# Patient Record
Sex: MALE | Race: OTHER | Employment: FULL TIME | URBAN - NONMETROPOLITAN AREA
[De-identification: names, ages, dates, MRNs, and addresses within clinical notes are randomized per-mention and may not be internally consistent; named-entity substitution may affect disease eponyms.]

---

## 2017-04-06 ENCOUNTER — OFFICE VISIT (OUTPATIENT)
Dept: PRIMARY CARE CLINIC | Age: 27
End: 2017-04-06

## 2017-04-06 VITALS
RESPIRATION RATE: 14 BRPM | DIASTOLIC BLOOD PRESSURE: 60 MMHG | SYSTOLIC BLOOD PRESSURE: 100 MMHG | HEIGHT: 67 IN | BODY MASS INDEX: 22.29 KG/M2 | OXYGEN SATURATION: 99 % | WEIGHT: 142 LBS | HEART RATE: 75 BPM | TEMPERATURE: 99 F

## 2017-04-06 DIAGNOSIS — J06.9 ACUTE URI: Primary | ICD-10-CM

## 2017-04-06 PROCEDURE — 99213 OFFICE O/P EST LOW 20 MIN: CPT | Performed by: FAMILY MEDICINE

## 2017-04-06 RX ORDER — DICYCLOMINE HYDROCHLORIDE 10 MG/1
10 CAPSULE ORAL
COMMUNITY
End: 2021-10-25

## 2017-04-06 ASSESSMENT — ENCOUNTER SYMPTOMS
EYES NEGATIVE: 1
GASTROINTESTINAL NEGATIVE: 1
RESPIRATORY NEGATIVE: 1
RHINORRHEA: 1
COUGH: 0
ALLERGIC/IMMUNOLOGIC NEGATIVE: 1
SORE THROAT: 1

## 2017-11-30 ENCOUNTER — TELEPHONE (OUTPATIENT)
Dept: FAMILY MEDICINE CLINIC | Age: 27
End: 2017-11-30

## 2020-11-19 ENCOUNTER — HOSPITAL ENCOUNTER (OUTPATIENT)
Age: 30
Setting detail: SPECIMEN
Discharge: HOME | End: 2020-11-19

## 2020-11-19 ENCOUNTER — OFFICE VISIT (OUTPATIENT)
Dept: PRIMARY CARE CLINIC | Age: 30
End: 2020-11-19

## 2020-11-19 VITALS
WEIGHT: 152 LBS | DIASTOLIC BLOOD PRESSURE: 74 MMHG | RESPIRATION RATE: 16 BRPM | TEMPERATURE: 98.4 F | HEART RATE: 68 BPM | BODY MASS INDEX: 24.43 KG/M2 | SYSTOLIC BLOOD PRESSURE: 114 MMHG | OXYGEN SATURATION: 98 % | HEIGHT: 66 IN

## 2020-11-19 PROCEDURE — 99213 OFFICE O/P EST LOW 20 MIN: CPT | Performed by: PHYSICIAN ASSISTANT

## 2020-11-19 PROCEDURE — 99212 OFFICE O/P EST SF 10 MIN: CPT

## 2020-11-19 PROCEDURE — U0003 INFECTIOUS AGENT DETECTION BY NUCLEIC ACID (DNA OR RNA); SEVERE ACUTE RESPIRATORY SYNDROME CORONAVIRUS 2 (SARS-COV-2) (CORONAVIRUS DISEASE [COVID-19]), AMPLIFIED PROBE TECHNIQUE, MAKING USE OF HIGH THROUGHPUT TECHNOLOGIES AS DESCRIBED BY CMS-2020-01-R: HCPCS

## 2020-11-19 ASSESSMENT — ENCOUNTER SYMPTOMS
DIARRHEA: 0
WHEEZING: 0
SHORTNESS OF BREATH: 1
RHINORRHEA: 1
NAUSEA: 1
SORE THROAT: 0
VOMITING: 1
CHEST TIGHTNESS: 1
COUGH: 1

## 2020-11-19 ASSESSMENT — PATIENT HEALTH QUESTIONNAIRE - PHQ9
SUM OF ALL RESPONSES TO PHQ QUESTIONS 1-9: 0
SUM OF ALL RESPONSES TO PHQ QUESTIONS 1-9: 0
2. FEELING DOWN, DEPRESSED OR HOPELESS: 0
SUM OF ALL RESPONSES TO PHQ QUESTIONS 1-9: 0

## 2020-11-19 NOTE — PROGRESS NOTES
[facility]    Subjective:      Patient ID: Tiffanie Carmen is a 26 y.o. y.o. male. Patient is seen today due to illness for 2 days. His SO is ill and a coworker has covid too. Has fever chills. Fevers come and go. Gets sweaty and cold. Has a dry cough. Feels like can not catch his breath with movement. Feels heart rate increase then too. Is slight achy. Has nausea and gagging. Some dry heaves noted. No diarrhea. Everything tastes like black licorice. Smell is ok. Took theraflu last night. Past Medical History:   Diagnosis Date    Deep vein thrombosis (DVT) (HCC)     right upper extremity    Ear infection     history of multiple as a child    IV drug abuse ([address]     Oxycontin    Marijuana abuse     Tobacco abuse        Past Surgical History:   Procedure Laterality Date    COLONOSCOPY  8/26/2015    Protitis    HAND SURGERY Right        Family History   Problem Relation Age of Onset    Heart Disease Other     Hypertension Other     Asthma Other     Diabetes Paternal Grandmother        No Known Allergies    Current Outpatient Medications   Medication Sig Dispense Refill    dicyclomine (BENTYL) 10 MG capsule Take 10 mg by mouth      ibuprofen (ADVIL;MOTRIN) 400 MG tablet Take 400 mg by mouth every 6 hours as needed for Pain       No current facility-administered medications for this visit. Review of Systems   Constitutional: Positive for chills, diaphoresis, fatigue and fever. Negative for appetite change. Is more hungry today. HENT: Positive for congestion, postnasal drip and rhinorrhea. Negative for sneezing and sore throat. Took motrin too. No flu shot. Respiratory: Positive for cough, chest tightness and shortness of breath. Negative for wheezing. Cardiovascular: Negative for chest pain and palpitations. Gastrointestinal: Positive for nausea and vomiting. Negative for diarrhea. Musculoskeletal: Positive for myalgias.    Skin: Negative for rash. Neurological: Positive for light-headedness and headaches. Negative for dizziness and numbness. Psychiatric/Behavioral: Negative for sleep disturbance. The patient is not nervous/anxious. Sleeping a lot. Objective:      /74   Pulse 68   Temp 98.4 °F (36.9 °C) (Temporal)   Resp 16   Ht 5' 6\" (1.676 m)   Wt 152 lb (68.9 kg)   SpO2 98%   BMI 24.53 kg/m²     Physical Exam  Vitals signs and nursing note reviewed. Constitutional:       General: He is not in acute distress. Appearance: Normal appearance. He is well-developed. He is not ill-appearing. HENT:      Head: Normocephalic and atraumatic. Right Ear: Tympanic membrane, ear canal and external ear normal.      Left Ear: Tympanic membrane, ear canal and external ear normal.      Nose: Congestion present. No rhinorrhea. Mouth/Throat:      Mouth: Mucous membranes are moist.      Pharynx: No oropharyngeal exudate or posterior oropharyngeal erythema. Eyes:      General: No scleral icterus. Conjunctiva/sclera: Conjunctivae normal.   Neck:      Musculoskeletal: Normal range of motion and neck supple. No neck rigidity or muscular tenderness. Cardiovascular:      Rate and Rhythm: Normal rate and regular rhythm. Heart sounds: Normal heart sounds. No murmur. No gallop. Pulmonary:      Effort: Pulmonary effort is normal. No respiratory distress. Breath sounds: Normal breath sounds. No stridor. No wheezing, rhonchi or rales. Abdominal:      General: Bowel sounds are normal. There is no distension. Palpations: Abdomen is soft. There is no mass. Tenderness: There is no abdominal tenderness. There is no guarding or rebound. Hernia: No hernia is present. Musculoskeletal:         General: No swelling, tenderness, deformity or signs of injury. Right lower leg: No edema. Left lower leg: No edema. Lymphadenopathy:      Cervical: No cervical adenopathy.    Skin:

## 2020-11-21 LAB — SARS-COV-2, NAA: DETECTED

## 2021-10-25 ENCOUNTER — TELEPHONE (OUTPATIENT)
Dept: PRIMARY CARE CLINIC | Age: 31
End: 2021-10-25

## 2021-10-25 ENCOUNTER — HOSPITAL ENCOUNTER (OUTPATIENT)
Dept: GENERAL RADIOLOGY | Age: 31
Discharge: HOME | End: 2021-10-27
Payer: COMMERCIAL

## 2021-10-25 ENCOUNTER — OFFICE VISIT (OUTPATIENT)
Dept: PRIMARY CARE CLINIC | Age: 31
End: 2021-10-25
Payer: COMMERCIAL

## 2021-10-25 VITALS
BODY MASS INDEX: 23.56 KG/M2 | HEART RATE: 79 BPM | OXYGEN SATURATION: 98 % | HEIGHT: 66 IN | RESPIRATION RATE: 18 BRPM | WEIGHT: 146.6 LBS | SYSTOLIC BLOOD PRESSURE: 112 MMHG | DIASTOLIC BLOOD PRESSURE: 78 MMHG | TEMPERATURE: 97.7 F

## 2021-10-25 DIAGNOSIS — S83.412A SPRAIN OF MEDIAL COLLATERAL LIGAMENT OF LEFT KNEE, INITIAL ENCOUNTER: Primary | ICD-10-CM

## 2021-10-25 DIAGNOSIS — M25.562 LEFT MEDIAL KNEE PAIN: ICD-10-CM

## 2021-10-25 PROCEDURE — 73562 X-RAY EXAM OF KNEE 3: CPT

## 2021-10-25 PROCEDURE — 99213 OFFICE O/P EST LOW 20 MIN: CPT | Performed by: NURSE PRACTITIONER

## 2021-10-25 NOTE — LETTER
[address] [facility] A department of [place] [address]  Phone: [phone]  Fax: [phone]    MILAGROS Neal CNP        October 25, 2021     Patient: Amy Nash   YOB: 1990   Date of Visit: 10/25/2021       To Whom it May Concern:    Zan Gamez was seen in my clinic on 10/25/2021. He may return to work on 11/1/21 with no restrictions. If you have any questions or concerns, please don't hesitate to call.     Sincerely,         MILAGROS Neal CNP

## 2021-10-25 NOTE — LETTER
[facility] A department of [facility]  Phone: [phone]  Fax: [phone]        MILAGROS Neal CNP      November 6, 2021    Patient:   Amy Nash  Date of Birth   1990  Date of visit   10/25/2021        To Whom it May Concern:      Zan Gamez was seen in my clinic on 10/25/2021. Please excuse from work thru 11/22/2021. During this time he will be having tests done and visits with specialists. If you have any questions or concerns, please don't hesitate to call.       Sincerely,      MILAGROS Neal CNP/[place]

## 2021-10-25 NOTE — PROGRESS NOTES
Subjective:      Patient ID: Elmer Tejeda is a 33 y.o. male coming in for   Chief Complaint   Patient presents with    Knee Pain     workers comp. hurt knee at work trying to step over pallets, knee was stuck and twisted. left side. employer has not been notified. DOI: 10/22/2021.  Other     works at [employer] in [employer]        Knee Pain   Incident onset: 10/22/21. The incident occurred at work. The injury mechanism was a fall and a twisting injury (pt was stepping over pallets and got his left foot caught in between two pallets. pt fell forwards with foot caught. twisted knee during the fall). Pain location: left medial knee. The quality of the pain is described as aching. Pertinent negatives include no inability to bear weight. The symptoms are aggravated by weight bearing and movement. He has tried nothing for the symptoms. Review of Systems   Musculoskeletal: Positive for arthralgias. Objective:   Physical Exam  Vitals and nursing note reviewed. Constitutional:       General: He is not in acute distress. Appearance: Normal appearance. Pulmonary:      Effort: Pulmonary effort is normal.   Musculoskeletal:      Left knee: No crepitus. Normal range of motion. Tenderness present over the medial joint line. No LCL laxity, MCL laxity, ACL laxity or PCL laxity. Normal alignment and normal patellar mobility. Normal pulse. Neurological:      General: No focal deficit present. Mental Status: He is alert and oriented to person, place, and time. Assessment:      1. Sprain of medial collateral ligament of left knee, initial encounter    2. Left medial knee pain           Plan:    xray left knee: Normal exam  Continue with RICE therapy. Motrin for pain. Off remaining rest of week. Follow up ortho.        Orders Placed This Encounter   Procedures    XR KNEE LEFT (3 VIEWS)     Standing Status:   Future     Number of Occurrences:   1     Standing Expiration Date:   10/25/2022 Order Specific Question:   Reason for exam:     Answer:   pain to left medial knee twisted knee on 10/22/21   Sharyle Mandril, MD, Orthopedic Surgery, [place]     Referral Priority:   Routine     Referral Type:   Eval and Treat     Referral Reason:   Specialty Services Required     Referred to Provider:   Claudia Vo MD     Requested Specialty:   Orthopedic Surgery     Number of Visits Requested:   1      Outpatient Encounter Medications as of 10/25/2021   Medication Sig Dispense Refill    [DISCONTINUED] dicyclomine (BENTYL) 10 MG capsule Take 10 mg by mouth (Patient not taking: Reported on 10/25/2021)      [DISCONTINUED] ibuprofen (ADVIL;MOTRIN) 400 MG tablet Take 400 mg by mouth every 6 hours as needed for Pain (Patient not taking: Reported on 10/25/2021)       No facility-administered encounter medications on file as of 10/25/2021.             Dat Mcclendon, APRAUGUST - CNP

## 2021-10-30 ENCOUNTER — OFFICE VISIT (OUTPATIENT)
Dept: PRIMARY CARE CLINIC | Age: 31
End: 2021-10-30
Payer: COMMERCIAL

## 2021-10-30 VITALS
HEART RATE: 70 BPM | SYSTOLIC BLOOD PRESSURE: 120 MMHG | DIASTOLIC BLOOD PRESSURE: 74 MMHG | TEMPERATURE: 98.2 F | OXYGEN SATURATION: 98 %

## 2021-10-30 DIAGNOSIS — S83.412A SPRAIN OF MEDIAL COLLATERAL LIGAMENT OF LEFT KNEE, INITIAL ENCOUNTER: Primary | ICD-10-CM

## 2021-10-30 DIAGNOSIS — M25.562 LEFT MEDIAL KNEE PAIN: ICD-10-CM

## 2021-10-30 PROCEDURE — 99213 OFFICE O/P EST LOW 20 MIN: CPT | Performed by: NURSE PRACTITIONER

## 2021-10-30 RX ORDER — TRAMADOL HYDROCHLORIDE 50 MG/1
50 TABLET ORAL EVERY 6 HOURS PRN
Qty: 12 TABLET | Refills: 0 | Status: SHIPPED | OUTPATIENT
Start: 2021-10-30 | End: 2021-11-02

## 2021-10-30 NOTE — LETTER
[address] [facility] A department of [place] [address]  Phone: [phone]  Fax: [phone]    MILAGROS Garcia CNP        October 30, 2021     Patient: Hernan Rodriguez   YOB: 1990   Date of Visit: 10/30/2021       To Whom it May Concern:    Fer Schmidt was seen in my clinic on 10/30/2021. Please continue to excuse Mr. Huffman Marian from work until 11/8/21. If you have any questions or concerns, please don't hesitate to call.     Sincerely,         MILAGROS Garcia CNP

## 2021-10-30 NOTE — PROGRESS NOTES
Subjective:      Patient ID: Ashish Macias is a 33 y.o. male coming in for   Chief Complaint   Patient presents with    Knee Pain     left workers comp needs more time off waiting for approval to see ortho DOI 10/22/2021      HPI  Initial injury occurred 10/22/21. I treated pt on 10/25/21. At that time pt was advised weight bearing as tolerated. RICE therapy, motrin for pain, ortho referral placed. xray negative. Interval history includes: pt still waiting for approval to see ortho. Pt is currently non-weight bearing using crutches. Unable to fully extend knee. Feels as though it locks up on him. Review of Systems     Objective:  /74 (Site: Left Upper Arm, Position: Sitting, Cuff Size: Large Adult)   Pulse 70   Temp 98.2 °F (36.8 °C) (Tympanic)   SpO2 98%      Physical Exam  Vitals and nursing note reviewed. Constitutional:       General: He is not in acute distress. Appearance: Normal appearance. Pulmonary:      Effort: Pulmonary effort is normal.   Musculoskeletal:      Left knee: Swelling (mild swelling to medial knee) present. No crepitus. Normal range of motion. Tenderness present over the medial joint line. No LCL laxity, MCL laxity, ACL laxity or PCL laxity. Normal alignment and normal patellar mobility. Normal pulse. Neurological:      General: No focal deficit present. Mental Status: He is alert and oriented to person, place, and time. Assessment:      1. Sprain of medial collateral ligament of left knee, initial encounter    2. Left medial knee pain           Plan:    awaiting approval for ortho. Tramadol for pain. Mri of knee ordered as well. Work note provided.      Orders Placed This Encounter   Procedures    MRI KNEE LEFT WO CONTRAST     Standing Status:   Future     Standing Expiration Date:   10/30/2022     Order Specific Question:   Reason for exam:     Answer:   medial knee pain, locking sensation, unable to fully extend knee      Outpatient Encounter Medications as of 10/30/2021   Medication Sig Dispense Refill    traMADol (ULTRAM) 50 MG tablet Take 1 tablet by mouth every 6 hours as needed for Pain for up to 3 days. Intended supply: 3 days. Take lowest dose possible to manage pain 12 tablet 0     No facility-administered encounter medications on file as of 10/30/2021.             Yury Taylor, MILAGROS - CNP

## 2021-11-05 ENCOUNTER — TELEPHONE (OUTPATIENT)
Dept: PRIMARY CARE CLINIC | Age: 31
End: 2021-11-05

## 2021-11-16 ENCOUNTER — HOSPITAL ENCOUNTER (OUTPATIENT)
Dept: MRI IMAGING | Age: 31
Discharge: HOME | End: 2021-11-18
Payer: COMMERCIAL

## 2021-11-16 DIAGNOSIS — S83.412A SPRAIN OF MEDIAL COLLATERAL LIGAMENT OF LEFT KNEE, INITIAL ENCOUNTER: ICD-10-CM

## 2021-11-16 DIAGNOSIS — M25.562 LEFT MEDIAL KNEE PAIN: ICD-10-CM

## 2021-11-16 PROCEDURE — 73721 MRI JNT OF LWR EXTRE W/O DYE: CPT

## 2021-11-22 ENCOUNTER — OFFICE VISIT (OUTPATIENT)
Dept: ORTHOPEDIC SURGERY | Age: 31
End: 2021-11-22
Payer: COMMERCIAL

## 2021-11-22 VITALS
BODY MASS INDEX: 23.46 KG/M2 | WEIGHT: 146 LBS | HEART RATE: 65 BPM | SYSTOLIC BLOOD PRESSURE: 129 MMHG | HEIGHT: 66 IN | DIASTOLIC BLOOD PRESSURE: 72 MMHG

## 2021-11-22 DIAGNOSIS — S83.512A RUPTURE OF ANTERIOR CRUCIATE LIGAMENT OF LEFT KNEE, INITIAL ENCOUNTER: Primary | ICD-10-CM

## 2021-11-22 PROCEDURE — 99211 OFF/OP EST MAY X REQ PHY/QHP: CPT | Performed by: ORTHOPAEDIC SURGERY

## 2021-11-22 PROCEDURE — 99203 OFFICE O/P NEW LOW 30 MIN: CPT | Performed by: ORTHOPAEDIC SURGERY

## 2021-11-22 NOTE — PROGRESS NOTES
Orthopedic Office Note  [facility]  [address], [address]  [facility] [address] [phone]      CHIEF COMPLAINT:    Chief Complaint   Patient presents with    Pain     left knee pain       HISTORY OF PRESENT ILLNESS:      The patient is a 33 y.o. male  who presents today for evaluation of a left knee injury sustained on  while at work. His foot was stuck in between 2 pallets he lost his balance twisting and falling with the acute onset of left knee pain. He denies prior problems. He subsequently had an MRI scan obtained and was referred here for evaluation. Past Medical History:    Past Medical History:   Diagnosis Date    Deep vein thrombosis (DVT) (HCC)     right upper extremity    Ear infection     history of multiple as a child    IV drug abuse ([facility] [address]     Oxycontin    Marijuana abuse     Tobacco abuse        Past Surgical History:    Past Surgical History:   Procedure Laterality Date    COLONOSCOPY  2015    Protitis    HAND SURGERY Right        Medications Prior to Admission:   No current outpatient medications on file. No current facility-administered medications for this visit. Allergies:  Patient has no known allergies.     Social History:   Social History     Tobacco Use   Smoking Status Former Smoker    Packs/day: 0.50    Years: 3.00    Pack years: 1.50    Types: Cigarettes    Quit date: 11/15/2016    Years since quittin.0   Smokeless Tobacco Former User    Types: Chew     Social History     Substance and Sexual Activity   Alcohol Use Yes    Alcohol/week: 0.0 standard drinks    Comment: occassional- once every couple months 2 drinks mainly     Social History     Substance and Sexual Activity   Drug Use No    Comment: history of IV OxyContin abuse and marijuana abuse        Family History:  Family History   Problem Relation Age of Onset    Heart Disease Other     Hypertension Other     Asthma Other     Diabetes Paternal Grandmother          REVIEW OF SYSTEMS:  Please see the ROS form attached to today's encounter. I have reviewed it with the patient during the visit. All other systems were reviewed and are negative. PHYSICAL EXAM:  Examination today of his left knee reveals no joint effusion. He has no joint line tenderness. He has a negative Lachman's test.  He has full left knee range of motion with pain at the extremes of flexion. His gait is significantly antalgic today. He has negative posterior drawer and no varus or valgus instability. Radiology:  I reviewed x-rays and an MRI images and report of his left knee and agree with the findings of signal change within his ACL but no full-thickness tearing    ASSESSMENT/PLAN:  1. Rupture of anterior cruciate ligament of left knee, initial encounter        For his partial-thickness ACL tear I have recommended rest from work, physical therapy and weaning off the crutches. He will follow-up in 1 month to reassess his progress. At that time hopefully I return him back to work with some restrictions. No orders of the defined types were placed in this encounter.        Jose Li MD

## 2021-12-09 ENCOUNTER — HOSPITAL ENCOUNTER (OUTPATIENT)
Dept: PHYSICAL THERAPY | Age: 31
Setting detail: THERAPIES SERIES
Discharge: HOME | End: 2021-12-09
Payer: COMMERCIAL

## 2021-12-09 PROCEDURE — 97161 PT EVAL LOW COMPLEX 20 MIN: CPT | Performed by: PHYSICAL THERAPIST

## 2021-12-09 PROCEDURE — 97110 THERAPEUTIC EXERCISES: CPT | Performed by: PHYSICAL THERAPIST

## 2021-12-09 ASSESSMENT — PAIN DESCRIPTION - DESCRIPTORS: DESCRIPTORS: ACHING;SHARP

## 2021-12-09 ASSESSMENT — PAIN DESCRIPTION - PAIN TYPE: TYPE: ACUTE PAIN

## 2021-12-09 ASSESSMENT — PAIN - FUNCTIONAL ASSESSMENT: PAIN_FUNCTIONAL_ASSESSMENT: PREVENTS OR INTERFERES WITH ALL ACTIVE AND SOME PASSIVE ACTIVITIES

## 2021-12-09 ASSESSMENT — PAIN DESCRIPTION - PROGRESSION: CLINICAL_PROGRESSION: NOT CHANGED

## 2021-12-09 ASSESSMENT — PAIN DESCRIPTION - LOCATION: LOCATION: KNEE

## 2021-12-09 ASSESSMENT — PAIN DESCRIPTION - ORIENTATION: ORIENTATION: LEFT

## 2021-12-09 ASSESSMENT — PAIN DESCRIPTION - ONSET: ONSET: SUDDEN

## 2021-12-09 ASSESSMENT — PAIN DESCRIPTION - FREQUENCY: FREQUENCY: CONTINUOUS

## 2021-12-09 ASSESSMENT — PAIN SCALES - GENERAL: PAINLEVEL_OUTOF10: 8

## 2021-12-09 NOTE — PROGRESS NOTES
Physical Therapy  Initial Assessment  Date: 2021  Patient Name: Barbara Taylor  MRN: [id number]  : 1990     Treatment Diagnosis: S83.512 L ACL rupture    Restrictions- none       Subjective   General  Chart Reviewed: Yes  Patient assessed for rehabilitation services?: Yes  Response To Previous Treatment: Not applicable  Family / Caregiver Present: No  Referring Practitioner: Velia Diamond  Referral Date : 21  Diagnosis: S83.512 L ACL rupture  Follows Commands: Within Functional Limits  PT Visit Information  Onset Date: 10/22/21  PT Insurance Information: [facility]  Total # of Visits Approved: 12  Subjective  Subjective: I njured L knee at work on 10/22/21. No prior problem before the injury. Has been of work since the incident. Hopes to prevent surgery  Pain Screening  Patient Currently in Pain: Yes  Pain Assessment  Pain Assessment: 0-10  Pain Level: 8  Pain Type: Acute pain  Pain Location: Knee  Pain Orientation: Left  Pain Descriptors: Aching;  Sharp  Pain Frequency: Continuous  Pain Onset: Sudden  Clinical Progression: Not changed  Functional Pain Assessment: Prevents or interferes with all active and some passive activities  Vital Signs  Patient Currently in Pain: Yes    Vision/Hearing  Vision  Vision: Within Functional Limits  Hearing  Hearing: Within functional limits    Orientation  Orientation  Overall Orientation Status: Within Normal Limits    Social/Functional History  Social/Functional History  Lives With: Significant other  Type of Home: Apartment  Home Layout: One level  Home Access: Stairs to enter with rails  Entrance Stairs - Number of Steps: 3  Home Equipment: Crutches  Receives Help From: Family  ADL Assistance: Independent  Homemaking Assistance: Needs assistance  Ambulation Assistance: Independent  Transfer Assistance: Independent  Active : Yes  Occupation: On disability  Type of occupation: Manufacturing- presently off  IADL Comments: L knee eduardo, unstable  Additional Comments: Close to falling at times    Objective     Observation/Palpation  Posture: Fair  Observation: Severe limp. Using crutches    AROM RLE (degrees)  R Knee Flexion 0-145: 95 +pain  R Knee Extension 0: - 5 +pain  PROM LLE (degrees)  L Knee Flexion 0-145: 105  L Knee Extension 0: 12-15 deg hyprextension    Strength LLE  Comment: unable to 1 leg squat due to pain and buckling sensation  L Knee Flexion: 4-/5  L Knee Extension: 4-/5     Additional Measures  Special Tests: Anterior laxity , + Lachman, ant drawer  Other: Negative MCL, LCL laxity     Ambulation  Ambulation?: Yes  Ambulation 1  Surface: level tile  Device: Axillary Crutches  Quality of Gait: Severe limp  Stairs/Curb  Stairs?: Yes  Stairs  Stairs Height: 4\"  Comment: Can't step up or down without pain or instability     Assessment   Conditions Requiring Skilled Therapeutic Intervention  Body structures, Functions, Activity limitations: Decreased ROM; Decreased strength; Increased pain  Treatment Diagnosis: S83.512 L ACL rupture  Prognosis: Good  Decision Making: Low Complexity  REQUIRES PT FOLLOW UP: Yes  Activity Tolerance  Activity Tolerance: Patient limited by pain;  Patient Tolerated treatment well         Plan   Plan  Times per week: 2  Plan weeks: 6  Current Treatment Recommendations: Strengthening, ROM, Home Exercise Program, Neuromuscular Re-education, Manual Therapy - Joint Manipulation, Modalities    OutComes Score   LEFI 22/80 = 72% disability          Goals  Short term goals  Time Frame for Short term goals: 1 week  Short term goal 1: Start HEP  Long term goals  Time Frame for Long term goals : 6 weeks  Long term goal 1: L knee pain controlled at 2-3/10 to allow more normal gait  Long term goal 2: 5-/5 strength for joint stability  Long term goal 3: Step up/ down 8\" steps in reciprocal fashion  Long term goal 4: Tolerate standing, without devices 3-4 hr  Long term goal 5: RTW, starting at modified duty       Therapy Time   Individual Concurrent Group Co-treatment   Time In 1355         Time Out 1436         Minutes [address] [place], [place]

## 2021-12-09 NOTE — FLOWSHEET NOTE
Physical Therapy Daily Treatment Note    Date:  2021    Patient Name:  Carlotta Valdivia    :  1990  MRN: [id number]  Restrictions/Precautions:     Medical/Treatment Diagnosis Information:   · Diagnosis: S83.512 L ACL rupture  · Treatment Diagnosis: S83.512 L ACL rupture  Insurance/Certification information:  PT Insurance Information: [id number]  Physician Information:  Referring Practitioner: Billy Bad of care signed (Y/N): n   Visit# / total visits:    Pain level: 9/10       Time In: 1:55  Time Out:2:37    Progress Note: [x]  Yes  []  No  Next due by: Visit #12,  ( C-9 end date22     Subjective:See eval       Objective: See eval  Observation:   Test measurements:      Exercises:   Exercise/Equipment Resistance/Repetitions Other comments   Bike     L TKE grn 15x    HS curl -sit grn 15x    Squat matrix  Progress as able   Step up     4-way hip     Resist lateral walk     Monster walk     TM retro           PKF               IFC     [x] Provided verbal/tactile cueing for activities related to strengthening, flexibility, endurance, ROM. (20972)  [] Provided verbal/tactile cueing for activities related to improving balance, coordination, kinesthetic sense, posture, motor skill, proprioception. (66526)    Therapeutic Activities:     [] Therapeutic activities, direct (one-on-one) patient contact (use of dynamic activities to improve functional performance). (69063)    Gait:   [] Provided training and instruction to the patient for ambulation re-education. (42799)    Self-Care/ADL's  [] Self-care/home management training and compensatory training, meal preparation, safety procedures, and instructions in use of assistive technology devices/adaptive equipment, direct one-on-one contact.  (32381)    Home Exercise Program:  T-band TKE & HS curl   [x] Reviewed/Progressed HEP activities related to strengthening, flexibility, endurance, ROM. (19552)  [] Reviewed/Progressed HEP activities related to improving balance, coordination, kinesthetic sense, posture, motor skill, proprioception.  (58469)    Manual Treatments:    [] Provided manual therapy to mobilize soft tissue/joints for the purpose of modulating pain, promoting relaxation,  increasing ROM, reducing/eliminating soft tissue swelling/inflammation/restriction, improving soft tissue extensibility. (31398)    Service Based Modalities:  Eval 32'    Timed Code Treatment Minutes:    There ex/ HEP 10'    Total Treatment Minutes:  43     Treatment/Activity Tolerance:  [x] Patient tolerated treatment well [] Patient limited by fatique  [] Patient limited by pain  [] Patient limited by other medical complications  [] Other:     Prognosis: [x] Good [] Fair  [] Poor    Patient Requires Follow-up: [x] Yes  [] No      Goals:  Short term goals  Time Frame for Short term goals: 1 week  Short term goal 1: Start HEP    Long term goals  Time Frame for Long term goals : 6 weeks  Long term goal 1: L knee pain controlled at 2-3/10 to allow more normal gait  Long term goal 2: 5-/5 strength for joint stability  Long term goal 3: Step up/ down 8\" steps in reciprocal fashion  Long term goal 4: Tolerate standing, without devices 3-4 hr  Long term goal 5: RTW, starting at modified duty          Plan:   [] Continue per plan of care [] Alter current plan (see comments)  [x] Plan of care initiated [] Hold pending MD visit [] Discharge  Plan for Next Session:      Electronically signed by:  Manasa Cottrell PT,PT

## 2021-12-09 NOTE — PLAN OF CARE
[facility]    [x] [place]  Phone: [phone]  Fax: [phone]      [] [place]  Phone: [phone]  Fax: [phone]        To: Referring Practitioner: Young Galeano      Patient: Brina Kaiser  : 1990   MRN: [id number]  Evaluation Date: 2021      Diagnosis Information:  · Diagnosis: S83.512 L ACL rupture   · Treatment Diagnosis: S83.512 L ACL rupture     Physical Therapy Certification Form  Dear Ondina Toney  The following patient has been evaluated for physical therapy services and for therapy to continue, Medicare requires monthly physician review of the treatment plan. Please review the attached evaluation and/or summary of the patient's plan of care, and verify that you agree therapy should continue by signing the attached document and sending it back to our office.     Plan of Care/Treatment to date:  [x] Therapeutic Exercise    [] Modalities:  [] Therapeutic Activity     [] Ultrasound  [x] Electrical Stimulation  [] Gait Training      [] Cervical Traction [] Lumbar Traction  [x] Neuromuscular Re-education    [] Cold/hotpack [] Iontophoresis   [x] Instruction in HEP     Other:  [x] Manual Therapy      []             [] Aquatic Therapy      []                 Goals:  Short term goals  Time Frame for Short term goals: 1 week  Short term goal 1: Start HEP    Long term goals  Time Frame for Long term goals : 6 weeks  Long term goal 1: L knee pain controlled at 2-3/10 to allow more normal gait  Long term goal 2: 5-/5 strength for joint stability  Long term goal 3: Step up/ down 8\" steps in reciprocal fashion  Long term goal 4: Tolerate standing, without devices 3-4 hr  Long term goal 5: RTW, starting at modified duty    Frequency/Duration:21 - 22  # Days per week: [] 1 day # Weeks: [] 1 week [] 5 weeks     [x] 2 days   [] 2 weeks [x] 6 weeks     [] 3 days   [] 3 weeks [] 7 weeks     [] 4 days   [] 4 weeks [] 8 weeks    Rehab Potential: [] Excellent [x] Good [] Fair  [] Poor     Electronically signed by:  Woodrow Jeff PT      If you have any questions or concerns, please don't hesitate to call.   Thank you for your referral.      Physician Signature:________________________________Date:__________________  By signing above, therapists plan is approved by physician

## 2021-12-13 ENCOUNTER — HOSPITAL ENCOUNTER (OUTPATIENT)
Dept: PHYSICAL THERAPY | Age: 31
Setting detail: THERAPIES SERIES
Discharge: HOME | End: 2021-12-13
Payer: COMMERCIAL

## 2021-12-13 PROCEDURE — 97110 THERAPEUTIC EXERCISES: CPT

## 2021-12-13 PROCEDURE — G0283 ELEC STIM OTHER THAN WOUND: HCPCS

## 2021-12-13 NOTE — FLOWSHEET NOTE
Physical Therapy Daily Treatment Note    Date:  2021    Patient Name:  Brina Kaiser    :  1990  MRN: [id number]  Restrictions/Precautions:     Medical/Treatment Diagnosis Information:   · Diagnosis: S83.512 L ACL rupture  · Treatment Diagnosis: S83.512 L ACL rupture  Insurance/Certification information:  PT Insurance Information: [facility]  Physician Information:  Referring Practitioner: Fidelina Rossi of care signed (Y/N): n   Visit# / total visits:    Pain level: 310       Time In: 12:31   Time Out: 1:25    Progress Note: []  Yes  [x]  No  Next due by: Visit #12,  ( C-9 end date22     Subjective: Pt notes experiencing dull pain in lateral aspect of knee, just left of patella. Pt notes using crutches when ambulating longer distances. Objective: MIN performed per flow sheet to increase strength and stability. Exercises initiated with good tolerance. Pt presents with crutches this date, but ambulates throughout therapy room without. Pt requires multiple seated rest breaks throughout treatment. IFC to L knee to promote healing and decrease pain. Observation:   Test measurements:      Exercises:   Exercise/Equipment Resistance/Repetitions Other comments   Bike 3' Seat 4   L TKE grn 15x    HS curl -sit grn 15x    Squat matrix 5xea Progress as able   Step up 2\" 10x ea Fwd/lat   4-way hip 10x ea    Resist Lateral walk 3 laps at bars No resistance   Monster walk 2 laps at bars    TM retro 2 laps at bars          PKF 10x              IFC 15'    [x] Provided verbal/tactile cueing for activities related to strengthening, flexibility, endurance, ROM. (39608)  [] Provided verbal/tactile cueing for activities related to improving balance, coordination, kinesthetic sense, posture, motor skill, proprioception. (34008)    Therapeutic Activities:     [] Therapeutic activities, direct (one-on-one) patient contact (use of dynamic activities to improve functional performance).  (94814)    Gait:   [] Provided training and instruction to the patient for ambulation re-education. (69592)    Self-Care/ADL's  [] Self-care/home management training and compensatory training, meal preparation, safety procedures, and instructions in use of assistive technology devices/adaptive equipment, direct one-on-one contact. (13236)    Home Exercise Program:  T-band TKE & HS curl   [x] Reviewed/Progressed HEP activities related to strengthening, flexibility, endurance, ROM. (26042)  [] Reviewed/Progressed HEP activities related to improving balance, coordination, kinesthetic sense, posture, motor skill, proprioception.  (81172)    Manual Treatments:    [] Provided manual therapy to mobilize soft tissue/joints for the purpose of modulating pain, promoting relaxation,  increasing ROM, reducing/eliminating soft tissue swelling/inflammation/restriction, improving soft tissue extensibility.  (21891)    Service Based Modalities:  15' IFC to L knee to decrease pain and promote healing    Timed Code Treatment Minutes: 39'       Total Treatment Minutes:  47'     Treatment/Activity Tolerance:  [x] Patient tolerated treatment well [] Patient limited by fatique  [] Patient limited by pain  [] Patient limited by other medical complications  [] Other:     Prognosis: [x] Good [] Fair  [] Poor    Patient Requires Follow-up: [x] Yes  [] No      Goals:  Short term goals  Time Frame for Short term goals: 1 week  Short term goal 1: Start HEP    Long term goals  Time Frame for Long term goals : 6 weeks  Long term goal 1: L knee pain controlled at 2-3/10 to allow more normal gait  Long term goal 2: 5-/5 strength for joint stability  Long term goal 3: Step up/ down 8\" steps in reciprocal fashion  Long term goal 4: Tolerate standing, without devices 3-4 hr  Long term goal 5: RTW, starting at modified duty          Plan:   [x] Continue per plan of care [] Alter current plan (see comments)  [] Plan of care initiated [] Hold pending MD visit [] Discharge  Plan for Next Session:  Progress per pt tolerance.     Electronically signed by:  Carmen Nuñez, AMANDEEP,PT

## 2021-12-14 NOTE — PROGRESS NOTES
I have reviewed and agree to the content of the note written by the PTA.   Electronically signed by Ron Urban PT [id number]

## 2021-12-16 ENCOUNTER — HOSPITAL ENCOUNTER (OUTPATIENT)
Dept: PHYSICAL THERAPY | Age: 31
Setting detail: THERAPIES SERIES
Discharge: HOME | End: 2021-12-16
Payer: COMMERCIAL

## 2021-12-16 PROCEDURE — 97110 THERAPEUTIC EXERCISES: CPT | Performed by: PHYSICAL THERAPIST

## 2021-12-16 NOTE — FLOWSHEET NOTE
Physical Therapy Daily Treatment Note    Date:  2021    Patient Name:  Hernan Rodriguez    :  1990  MRN: [id number]  Restrictions/Precautions:     Medical/Treatment Diagnosis Information:   · Diagnosis: S83.512 L ACL rupture  · Treatment Diagnosis: S83.512 L ACL rupture  Insurance/Certification information:  PT Insurance Information: [facility]  Physician Information:  Referring Practitioner: Vianney Wick of care signed (Y/N): y   Visit# / total visits: 3 /12   Pain level: 3/10       Time In: 3:17   Time Out: 4:00    Progress Note: []  Yes  [x]  No  Next due by: Visit #12,  ( C-9 end date22     Subjective: May be walking a little better    Objective:   Observation:  7 weeks post onset   Test measurements:  L knee instability  Very slow with closed chain ex and wt shift      Exercises:   Exercise/Equipment Resistance/Repetitions Other comments   Bike 3' Seat 6   L TKE grn 20x    HS curl -sit grn 20x    Squat matrix 5x, 9 position Progress as able   Step up 4\" 10x ea Fwd/lat   4-way hip 10x ea    Resist Lateral walk 3 laps at bars [employer] walk 2 laps at bars, grn    TM retro 2 laps at bars          PKF 10x              IFC 15'    [x] Provided verbal/tactile cueing for activities related to strengthening, flexibility, endurance, ROM. (45177)  [] Provided verbal/tactile cueing for activities related to improving balance, coordination, kinesthetic sense, posture, motor skill, proprioception. (72707)    Therapeutic Activities:     [] Therapeutic activities, direct (one-on-one) patient contact (use of dynamic activities to improve functional performance). (47137)    Gait:   [] Provided training and instruction to the patient for ambulation re-education. (50760)    Self-Care/ADL's  [] Self-care/home management training and compensatory training, meal preparation, safety procedures, and instructions in use of assistive technology devices/adaptive equipment, direct one-on-one contact.  (22417)    Home Exercise Program:  T-band TKE & HS curl   [x] Reviewed/Progressed HEP activities related to strengthening, flexibility, endurance, ROM. (35285)  [] Reviewed/Progressed HEP activities related to improving balance, coordination, kinesthetic sense, posture, motor skill, proprioception.  (60575)    Manual Treatments:    [] Provided manual therapy to mobilize soft tissue/joints for the purpose of modulating pain, promoting relaxation,  increasing ROM, reducing/eliminating soft tissue swelling/inflammation/restriction, improving soft tissue extensibility. (78694)    Service Based Modalities:      Timed Code Treatment Minutes: 43 there ex'       Total Treatment Minutes:  37'     Treatment/Activity Tolerance:  [x] Patient tolerated treatment well [] Patient limited by fatique  [] Patient limited by pain  [] Patient limited by other medical complications  [] Other:     Prognosis: [x] Good [] Fair  [] Poor    Patient Requires Follow-up: [x] Yes  [] No      Goals:  Short term goals  Time Frame for Short term goals: 1 week  Short term goal 1: Start HEP    Long term goals  Time Frame for Long term goals : 6 weeks  Long term goal 1: L knee pain controlled at 2-3/10 to allow more normal gait  Long term goal 2: 5-/5 strength for joint stability  Long term goal 3: Step up/ down 8\" steps in reciprocal fashion  Long term goal 4: Tolerate standing, without devices 3-4 hr  Long term goal 5: RTW, starting at modified duty          Plan:   [x] Continue per plan of care [] Alter current plan (see comments)  [] Plan of care initiated [] Hold pending MD visit [] Discharge  Plan for Next Session:  Progress per pt tolerance.     Electronically signed by:  Sushil Acevedo, PT,PT

## 2021-12-20 ENCOUNTER — OFFICE VISIT (OUTPATIENT)
Dept: ORTHOPEDIC SURGERY | Age: 31
End: 2021-12-20
Payer: COMMERCIAL

## 2021-12-20 ENCOUNTER — HOSPITAL ENCOUNTER (OUTPATIENT)
Dept: PHYSICAL THERAPY | Age: 31
Setting detail: THERAPIES SERIES
Discharge: HOME | End: 2021-12-20
Payer: COMMERCIAL

## 2021-12-20 VITALS
BODY MASS INDEX: 23.46 KG/M2 | HEART RATE: 82 BPM | SYSTOLIC BLOOD PRESSURE: 132 MMHG | DIASTOLIC BLOOD PRESSURE: 88 MMHG | HEIGHT: 66 IN | WEIGHT: 146 LBS

## 2021-12-20 DIAGNOSIS — S83.511D RUPTURE OF ANTERIOR CRUCIATE LIGAMENT OF BOTH KNEES, SUBSEQUENT ENCOUNTER: Primary | ICD-10-CM

## 2021-12-20 DIAGNOSIS — S83.512D RUPTURE OF ANTERIOR CRUCIATE LIGAMENT OF BOTH KNEES, SUBSEQUENT ENCOUNTER: Primary | ICD-10-CM

## 2021-12-20 PROCEDURE — 99211 OFF/OP EST MAY X REQ PHY/QHP: CPT | Performed by: ORTHOPAEDIC SURGERY

## 2021-12-20 PROCEDURE — 99213 OFFICE O/P EST LOW 20 MIN: CPT | Performed by: ORTHOPAEDIC SURGERY

## 2021-12-20 NOTE — PROGRESS NOTES
Physical Therapy  Outpatient Physical Therapy    [x] [place]  Phone: [phone]  Fax: [phone]      [] [place]  Phone: [phone]  Fax: [phone]    Physical Therapy  Cancellation/No-show Note  Patient Name:  Reji López  :  1990   Date:  2021  Cancelled visits to date: 1   No-shows to date: 0    For today's appointment patient:  [x]  Cancelled  []  Rescheduled appointment  []  No-show     Reason given by patient:  []  Patient ill  []  Conflicting appointment  []  No transportation    []  Conflict with work  []  No reason given  [x]  Other:     Comments:  Called and stated could not make it    Electronically signed by: Aaron Connell PTA

## 2021-12-20 NOTE — PROGRESS NOTES
Orthopedic Office Note  [facility]  [address]  [address], [address]  [facility] [address] [phone]      CHIEF COMPLAINT:    Chief Complaint   Patient presents with    Knee Pain     rech left knee       HISTORY OF PRESENT ILLNESS:      The patient is a 33 y.o. male  who presents today for follow-up of his left knee partial-thickness anterior cruciate ligament tear sustained at work. He reports he continues to have significant pain in his left knee at nighttime during the day he feels more that he cannot trust his knee. He has been going to physical therapy. He takes anti-inflammatories to help with his pain. Past Medical History:    Past Medical History:   Diagnosis Date    Deep vein thrombosis (DVT) ([facility])     right upper extremity    Ear infection     history of multiple as a child    IV drug abuse ([facility] [address]     Oxycontin    Marijuana abuse     Tobacco abuse        Past Surgical History:    Past Surgical History:   Procedure Laterality Date    COLONOSCOPY  2015    Protitis    HAND SURGERY Right        Medications Prior to Admission:   No current outpatient medications on file. No current facility-administered medications for this visit. Allergies:  Patient has no known allergies.     Social History:   Social History     Tobacco Use   Smoking Status Former Smoker    Packs/day: 0.50    Years: 3.00    Pack years: 1.50    Types: Cigarettes    Quit date: 11/15/2016    Years since quittin.0   Smokeless Tobacco Former User    Types: Chew     Social History     Substance and Sexual Activity   Alcohol Use Yes    Alcohol/week: 0.0 standard drinks    Comment: occassional- once every couple months 2 drinks mainly     Social History     Substance and Sexual Activity   Drug Use No    Comment: history of IV OxyContin abuse and marijuana abuse        Family History:  Family History   Problem Relation Age of Onset  Heart Disease Other     Hypertension Other     Asthma Other     Diabetes Paternal Grandmother          REVIEW OF SYSTEMS:  Please see the ROS form attached to today's encounter. I have reviewed it with the patient during the visit. All other systems were reviewed and are negative. PHYSICAL EXAM:  Left knee exam today reveals no effusion. He has full range of motion. He has no ligamentous instability. Gait is very guarded and slow today but not antalgic. Radiology:      ASSESSMENT/PLAN:  1. Rupture of anterior cruciate ligament of both knees, subsequent encounter        I discussed with the patient that this is taking longer than expected to get his knee back to a functional level. He will need to continue with physical therapy. I recommended he work on weaning off the crutches. We will get him into seated duty work. He will follow-up in 1 month to reassess his progress. No orders of the defined types were placed in this encounter.        Prashanth Roman MD

## 2021-12-21 ENCOUNTER — HOSPITAL ENCOUNTER (OUTPATIENT)
Dept: PHYSICAL THERAPY | Age: 31
Setting detail: THERAPIES SERIES
Discharge: HOME | End: 2021-12-21
Payer: COMMERCIAL

## 2021-12-21 PROCEDURE — 97110 THERAPEUTIC EXERCISES: CPT | Performed by: PHYSICAL THERAPIST

## 2021-12-21 PROCEDURE — G0283 ELEC STIM OTHER THAN WOUND: HCPCS | Performed by: PHYSICAL THERAPIST

## 2021-12-21 NOTE — FLOWSHEET NOTE
Physical Therapy Daily Treatment Note    Date:  2021    Patient Name:  Reji López    :  1990  MRN: [id number]  Restrictions/Precautions:     Medical/Treatment Diagnosis Information:   · Diagnosis: S83.512 L ACL rupture  · Treatment Diagnosis: S83.512 L ACL rupture  Insurance/Certification information:  PT Insurance Information: [facility]  Physician Information:  Referring Practitioner: Dilia Johnson of care signed (Y/N): y   Visit# / total visits:    Pain level: 310       Time In: 2:54   Time Out: 3:54    Progress Note: []  Yes  [x]  No  Next due by: Visit #12,  (C-9 end date22)     Subjective:  \"I was able to walk all the way from the parking to the therapy gym without my crutches today. It hurt a little bit, and I had to go slow, but I was able to do it. Pivoting on the knee are still bad, and bending or squatting are difficult. \"    Objective:   Observation:  7 weeks post onset   Test measurements:  L knee instability  Very slow with closed chain ex and wt shift    Knee flexion: 110°    Exercises:   Exercise/Equipment Resistance/Repetitions Other comments   Bike 6' Seat 6   L TKE grn 20x    HS curl -sit grn 20x    Squat matrix 5x, 9 position Progress as able   Step up 4\" 10x ea Fwd/lat   4-way hip 10x ea    Resist Lateral walk 3 laps at bars [employer] walk 2 laps at bars, grn    TM retro 2 laps at bars          PKF 10x    Supine Knee slides 15x         IFC 15'    [x] Provided verbal/tactile cueing for activities related to strengthening, flexibility, endurance, ROM. (70972)  [] Provided verbal/tactile cueing for activities related to improving balance, coordination, kinesthetic sense, posture, motor skill, proprioception. (78245)    Therapeutic Activities:     [] Therapeutic activities, direct (one-on-one) patient contact (use of dynamic activities to improve functional performance). (86060)    Gait:   [] Provided training and instruction to the patient for ambulation re-education. (73825)    Self-Care/ADL's  [] Self-care/home management training and compensatory training, meal preparation, safety procedures, and instructions in use of assistive technology devices/adaptive equipment, direct one-on-one contact. (17166)    Home Exercise Program:  T-band TKE & HS curl   [x] Reviewed/Progressed HEP activities related to strengthening, flexibility, endurance, ROM. (64084)  [] Reviewed/Progressed HEP activities related to improving balance, coordination, kinesthetic sense, posture, motor skill, proprioception.  (92905)    Manual Treatments:    [] Provided manual therapy to mobilize soft tissue/joints for the purpose of modulating pain, promoting relaxation,  increasing ROM, reducing/eliminating soft tissue swelling/inflammation/restriction, improving soft tissue extensibility. (54335)    Service Based Modalities:  15' IFC e-stim for pain reduction    Timed Code Treatment Minutes: 39' there ex      Total Treatment Minutes:  61'     Treatment/Activity Tolerance:  [x] Patient tolerated treatment well [] Patient limited by fatique  [] Patient limited by pain  [] Patient limited by other medical complications  [] Other:     Prognosis: [x] Good [] Fair  [] Poor    Patient Requires Follow-up: [x] Yes  [] No      Goals:  Short term goals  Time Frame for Short term goals: 1 week  Short term goal 1: Start HEP    Long term goals  Time Frame for Long term goals : 6 weeks  Long term goal 1: L knee pain controlled at 2-3/10 to allow more normal gait  Long term goal 2: 5-/5 strength for joint stability  Long term goal 3: Step up/ down 8\" steps in reciprocal fashion  Long term goal 4: Tolerate standing, without devices 3-4 hr  Long term goal 5: RTW, starting at modified duty    Plan:   [x] Continue per plan of care [] Alter current plan (see comments)  [] Plan of care initiated [] Hold pending MD visit [] Discharge    Plan for Next Session:  Progress per pt tolerance.     Electronically signed by:  Ashley Eller

## 2021-12-23 ENCOUNTER — HOSPITAL ENCOUNTER (OUTPATIENT)
Dept: PHYSICAL THERAPY | Age: 31
Setting detail: THERAPIES SERIES
Discharge: HOME | End: 2021-12-23
Payer: COMMERCIAL

## 2021-12-27 ENCOUNTER — HOSPITAL ENCOUNTER (OUTPATIENT)
Dept: PHYSICAL THERAPY | Age: 31
Setting detail: THERAPIES SERIES
Discharge: HOME | End: 2021-12-27
Payer: COMMERCIAL

## 2021-12-27 NOTE — PROGRESS NOTES
Physical Therapy  Outpatient Physical Therapy    [x] [place]  Phone: [phone]  Fax: [phone]      [] [place]  Phone: [phone]  Fax: [phone]    Physical Therapy  Cancellation/No-show Note  Patient Name:  Carlotta Valdivia  :  1990   Date:  2021  Cancelled visits to date: 3  No-shows to date: 0    For today's appointment patient:  [x]  Cancelled  []  Rescheduled appointment  []  No-show     Reason given by patient:  [x]  Patient ill  []  Conflicting appointment  []  No transportation    []  Conflict with work  []  No reason given   []  Other:     Comments:      Electronically signed by: Saira Reeves PTA

## 2021-12-30 ENCOUNTER — APPOINTMENT (OUTPATIENT)
Dept: PHYSICAL THERAPY | Age: 31
End: 2021-12-30
Payer: COMMERCIAL

## 2022-01-04 ENCOUNTER — HOSPITAL ENCOUNTER (OUTPATIENT)
Dept: PHYSICAL THERAPY | Age: 32
Setting detail: THERAPIES SERIES
Discharge: HOME | End: 2022-01-04
Payer: COMMERCIAL

## 2022-01-04 PROCEDURE — G0283 ELEC STIM OTHER THAN WOUND: HCPCS

## 2022-01-04 PROCEDURE — 97110 THERAPEUTIC EXERCISES: CPT

## 2022-01-04 NOTE — FLOWSHEET NOTE
Physical Therapy Daily Treatment Note    Date:  2022    Patient Name:  Olya Meyer    :  1990  MRN: [id number]  Restrictions/Precautions:     Medical/Treatment Diagnosis Information:   · Diagnosis: S83.512 L ACL rupture  · Treatment Diagnosis: S83.512 L ACL rupture  Insurance/Certification information:  PT Insurance Information: [facility]  Physician Information:  Referring Practitioner: Yuni Ricketts of care signed (Y/N): y   Visit# / total visits:    Pain level: 2/10       Time In: 1:45   Time Out: 2:35    Progress Note: []  Yes  [x]  No  Next due by: Visit #12,  (C-9 end date22)     Subjective:  Patient reports knee is getting better. Pt reports knee bending and squatting are still painful and difficult. Objective: MIN performer per flow sheet for improved mobility, strengthening, and decrease in pain for improved ambulation and daily living activities. Verbal cueing for sequencing. Observation:  7 weeks post onset   Test measurements:  L knee instability    L knee flexion: 3+/5    Exercises:   Exercise/Equipment Resistance/Repetitions Other comments   Bike 6' Seat 6   L TKE grn 20x    HS curl -sit grn 20x    Squat matrix 5x, 9 position Progress as able   Step up 4\" 12x ea Fwd/lat   4-way hip 12x ea    Resist Lateral walk 3 laps at bars [employer] walk 2 laps at bars, grn    TM retro 2 laps at bars          PKF 15x    Supine Knee slides 15x         IFC 15'    [x] Provided verbal/tactile cueing for activities related to strengthening, flexibility, endurance, ROM. (85292)  [] Provided verbal/tactile cueing for activities related to improving balance, coordination, kinesthetic sense, posture, motor skill, proprioception. (33775)    Therapeutic Activities:     [] Therapeutic activities, direct (one-on-one) patient contact (use of dynamic activities to improve functional performance). (97065)    Gait:   [] Provided training and instruction to the patient for ambulation re-education. (12185)    Self-Care/ADL's  [] Self-care/home management training and compensatory training, meal preparation, safety procedures, and instructions in use of assistive technology devices/adaptive equipment, direct one-on-one contact. (43059)    Home Exercise Program:  T-band TKE & HS curl   [x] Reviewed/Progressed HEP activities related to strengthening, flexibility, endurance, ROM. (71795)  [] Reviewed/Progressed HEP activities related to improving balance, coordination, kinesthetic sense, posture, motor skill, proprioception.  (06725)    Manual Treatments:    [] Provided manual therapy to mobilize soft tissue/joints for the purpose of modulating pain, promoting relaxation,  increasing ROM, reducing/eliminating soft tissue swelling/inflammation/restriction, improving soft tissue extensibility. (00363)    Service Based Modalities:  15' IFC e-stim for pain reduction    Timed Code Treatment Minutes: 28' there ex      Total Treatment Minutes:  48'     Treatment/Activity Tolerance:  [x] Patient tolerated treatment well [] Patient limited by fatique  [] Patient limited by pain  [] Patient limited by other medical complications  [] Other:     Prognosis: [x] Good [] Fair  [] Poor    Patient Requires Follow-up: [x] Yes  [] No      Goals:  Short term goals  Time Frame for Short term goals: 1 week  Short term goal 1: Start HEP    Long term goals  Time Frame for Long term goals : 6 weeks  Long term goal 1: L knee pain controlled at 2-3/10 to allow more normal gait  Long term goal 2: 5-/5 strength for joint stability  Long term goal 3: Step up/ down 8\" steps in reciprocal fashion  Long term goal 4: Tolerate standing, without devices 3-4 hr  Long term goal 5: RTW, starting at modified duty    Plan:   [x] Continue per plan of care [] Alter current plan (see comments)  [] Plan of care initiated [] Hold pending MD visit [] Discharge    Plan for Next Session:  Progress per pt tolerance.     Electronically signed by:  Caryn Charlton PTA

## 2022-01-05 ENCOUNTER — HOSPITAL ENCOUNTER (OUTPATIENT)
Dept: PHYSICAL THERAPY | Age: 32
Setting detail: THERAPIES SERIES
Discharge: HOME | End: 2022-01-05
Payer: COMMERCIAL

## 2022-01-05 PROCEDURE — G0283 ELEC STIM OTHER THAN WOUND: HCPCS

## 2022-01-05 PROCEDURE — 97110 THERAPEUTIC EXERCISES: CPT

## 2022-01-05 NOTE — FLOWSHEET NOTE
Physical Therapy Daily Treatment Note    Date:  2022    Patient Name:  Lashanda Roque    :  1990  MRN: [id number]  Restrictions/Precautions:     Medical/Treatment Diagnosis Information:   · Diagnosis: S83.512 L ACL rupture  · Treatment Diagnosis: S83.512 L ACL rupture  Insurance/Certification information:  PT Insurance Information: [facility]  Physician Information:  Referring Practitioner: Alissa Tapia of care signed (Y/N): y   Visit# / total visits:    Pain level: 5/10 stiff      Time In: 2:45   Time Out: 3:41    Progress Note: []  Yes  [x]  No  Next due by: Visit #12,  (C-9 end date22)     Subjective:  Patient reports knee is getting better. Patient states knee is slightly stiff this date. Pt reports therapy yesterday was good. Objective: MIN performer per flow sheet for improved mobility, strengthening, and decrease in pain for improved ambulation and daily living activities. Verbal cueing for sequencing. Difficulty with squats.  Added exercises this date with good tolerance     Observation:  7 weeks post onset   Test measurements:  L knee instability    L knee ext: 4/5    Exercises:   Exercise/Equipment Resistance/Repetitions Other comments   Bike 6' Seat 6   L TKE grn 20x    HS curl -sit grn 20x    Squat matrix 5x, 9 position Progress as able   Step up 4\" 12x ea Fwd/lat   4-way hip 15x ea    Resist Lateral walk 3 laps at bars [employer] walk 2 laps at bars, grn    TM retro 3 laps at bars    lunges           PKF 15x    Supine Knee slides 15x    SLR 10x    SL hip abduction left 10x                   IFC 15'    [x] Provided verbal/tactile cueing for activities related to strengthening, flexibility, endurance, ROM. (10352)  [] Provided verbal/tactile cueing for activities related to improving balance, coordination, kinesthetic sense, posture, motor skill, proprioception. (43971)    Therapeutic Activities:     [] Therapeutic activities, direct (one-on-one) patient contact (use of dynamic activities to improve functional performance). (14765)    Gait:   [] Provided training and instruction to the patient for ambulation re-education. (59744)    Self-Care/ADL's  [] Self-care/home management training and compensatory training, meal preparation, safety procedures, and instructions in use of assistive technology devices/adaptive equipment, direct one-on-one contact. (76848)    Home Exercise Program:  T-band TKE & HS curl   [x] Reviewed/Progressed HEP activities related to strengthening, flexibility, endurance, ROM. (25134)  [] Reviewed/Progressed HEP activities related to improving balance, coordination, kinesthetic sense, posture, motor skill, proprioception.  (67075)    Manual Treatments:    [] Provided manual therapy to mobilize soft tissue/joints for the purpose of modulating pain, promoting relaxation,  increasing ROM, reducing/eliminating soft tissue swelling/inflammation/restriction, improving soft tissue extensibility.  (79762)    Service Based Modalities:  15' IFC e-stim for pain reduction    Timed Code Treatment Minutes: 39' there ex      Total Treatment Minutes:  64'     Treatment/Activity Tolerance:  [x] Patient tolerated treatment well [] Patient limited by fatique  [] Patient limited by pain  [] Patient limited by other medical complications  [] Other:     Prognosis: [x] Good [] Fair  [] Poor    Patient Requires Follow-up: [x] Yes  [] No      Goals:  Short term goals  Time Frame for Short term goals: 1 week  Short term goal 1: Start HEP    Long term goals  Time Frame for Long term goals : 6 weeks  Long term goal 1: L knee pain controlled at 2-3/10 to allow more normal gait  Long term goal 2: 5-/5 strength for joint stability  Long term goal 3: Step up/ down 8\" steps in reciprocal fashion  Long term goal 4: Tolerate standing, without devices 3-4 hr  Long term goal 5: RTW, starting at modified duty    Plan:   [x] Continue per plan of care [] Alter current plan (see comments)  [] Plan of care initiated [] Hold pending MD visit [] Discharge    Plan for Next Session:  Progress per pt tolerance.     Electronically signed by:  Khadijah Bell PTA

## 2022-01-05 NOTE — PROGRESS NOTES
I have reviewed and agree to the content of the note written by the PTA.   Electronically signed by Jake Freeman PT [id number]

## 2022-01-06 NOTE — PROGRESS NOTES
I have reviewed and agree to the content of the note written by the PTA.   Electronically signed by Sergo Padilla PT [id number]

## 2022-01-10 ENCOUNTER — HOSPITAL ENCOUNTER (OUTPATIENT)
Dept: PHYSICAL THERAPY | Age: 32
Setting detail: THERAPIES SERIES
Discharge: HOME | End: 2022-01-10
Payer: COMMERCIAL

## 2022-01-10 PROCEDURE — 97110 THERAPEUTIC EXERCISES: CPT

## 2022-01-10 PROCEDURE — G0283 ELEC STIM OTHER THAN WOUND: HCPCS

## 2022-01-10 NOTE — FLOWSHEET NOTE
Physical Therapy Daily Treatment Note    Date:  1/10/2022    Patient Name:  West Bell    :  1990  MRN: [id number]  Restrictions/Precautions:     Medical/Treatment Diagnosis Information:   · Diagnosis: S83.512 L ACL rupture  · Treatment Diagnosis: S83.512 L ACL rupture  Insurance/Certification information:  PT Insurance Information: [facility]  Physician Information:  Referring Practitioner: Anoop Pate of care signed (Y/N): y   Visit# / total visits:    Pain level: 3/10 stiff      Time In: 3:34   Time Out: 4:30    Progress Note: []  Yes  [x]  No  Next due by: Visit #12,  (C-9 end date22)     Subjective:  Patient reports knee is getting better. Patient states knee is slightly stiff this date. Pt reports forgot to wear brace this date. Pt to see ortho next monday    Objective: MIN performed per flow sheet for improved mobility, strengthening, and decrease in pain for improved ambulation and daily living activities. Verbal cueing for sequencing. Difficulty with squats. Added exercises this date with good tolerance. Initiated retro walking on treadmill this date.     Observation:  7 weeks post onset   Test measurements:  L knee instability        Exercises:   Exercise/Equipment Resistance/Repetitions Other comments   Bike 6' Seat 6   L TKE grn 20x    HS curl -sit grn 20x    Squat matrix 5x, 9 position Progress as able   Step up 4\" 12x ea Fwd/lat   4-way hip 15x ea    Resist Lateral walk 3 laps at bars [employer] walk 2 laps at bars, grn    TM retro 4'    single leg heel taps 12x          PKF 15x    Supine Knee slides 15x    SLR 15x    SL hip abduction left 15x                   IFC 15'    [x] Provided verbal/tactile cueing for activities related to strengthening, flexibility, endurance, ROM. (34687)  [] Provided verbal/tactile cueing for activities related to improving balance, coordination, kinesthetic sense, posture, motor skill, proprioception. (74607)    Therapeutic Activities:     [] Therapeutic activities, direct (one-on-one) patient contact (use of dynamic activities to improve functional performance). (24039)    Gait:   [] Provided training and instruction to the patient for ambulation re-education. (60403)    Self-Care/ADL's  [] Self-care/home management training and compensatory training, meal preparation, safety procedures, and instructions in use of assistive technology devices/adaptive equipment, direct one-on-one contact. (83632)    Home Exercise Program:  T-band TKE & HS curl   [x] Reviewed/Progressed HEP activities related to strengthening, flexibility, endurance, ROM. (71098)  [] Reviewed/Progressed HEP activities related to improving balance, coordination, kinesthetic sense, posture, motor skill, proprioception.  (48088)    Manual Treatments:    [] Provided manual therapy to mobilize soft tissue/joints for the purpose of modulating pain, promoting relaxation,  increasing ROM, reducing/eliminating soft tissue swelling/inflammation/restriction, improving soft tissue extensibility.  (85542)    Service Based Modalities:  15' IFC e-stim for pain reduction    Timed Code Treatment Minutes: 39' there ex      Total Treatment Minutes:  64'     Treatment/Activity Tolerance:  [x] Patient tolerated treatment well [] Patient limited by fatique  [] Patient limited by pain  [] Patient limited by other medical complications  [] Other:     Prognosis: [x] Good [] Fair  [] Poor    Patient Requires Follow-up: [x] Yes  [] No      Goals:  Short term goals  Time Frame for Short term goals: 1 week  Short term goal 1: Start HEP    Long term goals  Time Frame for Long term goals : 6 weeks  Long term goal 1: L knee pain controlled at 2-3/10 to allow more normal gait  Long term goal 2: 5-/5 strength for joint stability  Long term goal 3: Step up/ down 8\" steps in reciprocal fashion  Long term goal 4: Tolerate standing, without devices 3-4 hr  Long term goal 5: RTW, starting at modified duty    Plan:   [x] Continue per plan of care [] Alter current plan (see comments)  [] Plan of care initiated [] Hold pending MD visit [] Discharge    Plan for Next Session:  Progress per pt tolerance.     Electronically signed by:  Pau Holder PTA

## 2022-01-11 NOTE — PROGRESS NOTES
I have reviewed and agree to the content of the note written by the PTA.   Electronically signed by Susan Cortés PT [id number]

## 2022-01-14 ENCOUNTER — HOSPITAL ENCOUNTER (OUTPATIENT)
Dept: PHYSICAL THERAPY | Age: 32
Setting detail: THERAPIES SERIES
Discharge: HOME | End: 2022-01-14
Payer: COMMERCIAL

## 2022-01-14 PROCEDURE — 97110 THERAPEUTIC EXERCISES: CPT

## 2022-01-14 NOTE — FLOWSHEET NOTE
Physical Therapy Daily Treatment Note    Date:  2022    Patient Name:  Laverne Block    :  1990  MRN: [id number]  Restrictions/Precautions:     Medical/Treatment Diagnosis Information:   · Diagnosis: S83.512 L ACL rupture  · Treatment Diagnosis: S83.512 L ACL rupture  Insurance/Certification information:  PT Insurance Information: [facility]  Physician Information:  Referring Practitioner: Emanuel Shanks of care signed (Y/N): y   Visit# / total visits:    Pain level: 7/10sharp and sore     Time In: 1230   Time Out: 120    Progress Note: [x]  Yes  []  No  Next due by: Visit #12,  (C-9 end date 22)     Subjective:  Patient reporting no swelling, pain at worst 7/10, 3/10 at best.  Patient noting a feeling of giving out and instability. Patient noting shifting out to the side. Pain with squatting. Amb without device, has limited activity. Patient noting has been unable to do any activity without having more pain and instability. Currently on light duty. Objective:    MIN performed per flow sheet for improved mobility, strengthening, and decrease in pain for improved ambulation and daily living activities. Verbal cueing for sequencing. Difficulty with squats. Added exercises this date with good tolerance. Initiated retro walking on treadmill this date.       Observation:  12 weeks post onset   Test measurements:  L knee instability    L hip Flex 4/5  L hip abd/add 4+/5  L Knee Ext 4/5  L knee Flex 4- to 4/5  L knee 0-129  R knee 0-145    Assessment:   Patient continues to have difficulty with instability, sig weakness on the left and loss of ROM. Patient demonstrates improvement with gait without device, but can only tolerate standing up to 10-15 min with ADLs.          Plan:   Recommend Continue PT 2-3x/week for 6-8 weeks, unless planning further medical management.      Exercises:   Exercise/Equipment Resistance/Repetitions Other comments   Bike 6' Seat 6   L TKE grn 20x    HS curl -sit grn 20x    Squat matrix 5x, 9 position Progress as able   Step up 4\" 12x ea Fwd/lat   4-way hip 15x ea    Resist Lateral walk 3 laps at bars  Minimally invasive devices walk 3 laps at bars, grn    TM retro 4'    single leg heel taps 15x           PKF 15x    Supine Knee slides 15x    SLR 15x    SL hip abduction left 15x    Prone hip ext  15x              IFC 15'    [x] Provided verbal/tactile cueing for activities related to strengthening, flexibility, endurance, ROM. (12209)  [] Provided verbal/tactile cueing for activities related to improving balance, coordination, kinesthetic sense, posture, motor skill, proprioception. (33089)    Therapeutic Activities:     [] Therapeutic activities, direct (one-on-one) patient contact (use of dynamic activities to improve functional performance). (83383)    Gait:   [] Provided training and instruction to the patient for ambulation re-education. (55786)    Self-Care/ADL's  [] Self-care/home management training and compensatory training, meal preparation, safety procedures, and instructions in use of assistive technology devices/adaptive equipment, direct one-on-one contact. (60480)    Home Exercise Program:  T-band TKE & HS curl   [x] Reviewed/Progressed HEP activities related to strengthening, flexibility, endurance, ROM. (77119)  [] Reviewed/Progressed HEP activities related to improving balance, coordination, kinesthetic sense, posture, motor skill, proprioception.  (65249)    Manual Treatments:    [] Provided manual therapy to mobilize soft tissue/joints for the purpose of modulating pain, promoting relaxation,  increasing ROM, reducing/eliminating soft tissue swelling/inflammation/restriction, improving soft tissue extensibility.  (50237)    Service Based Modalities:      Timed Code Treatment Minutes: 48' there ex      Total Treatment Minutes:  48'     Treatment/Activity Tolerance:  [x] Patient tolerated treatment well [] Patient limited by fatique  [] Patient limited by pain  [] Patient limited by other medical complications  [] Other:     Prognosis: [x] Good [] Fair  [] Poor    Patient Requires Follow-up: [x] Yes  [] No      Goals:  Short term goals  Time Frame for Short term goals: 1 week  Short term goal 1: Start HEP (not met)   Long term goals  Time Frame for Long term goals : 6 weeks  Long term goal 1: L knee pain controlled at 2-3/10 to allow more normal gait (not met)   Long term goal 2: 5-/5 strength for joint stability (not met)   Long term goal 3: Step up/ down 8\" steps in reciprocal fashion (guarded completing 4\" steps with increased pain)  Long term goal 4: Tolerate standing, without devices 3-4 hr (not met 10-15min then needs to sit)   Long term goal 5: RTW, starting at modified duty (currently light duty and sitting)         Plan:   [x] Continue per plan of care [] Alter current plan (see comments)  [] Plan of care initiated [] Hold pending MD visit [] Discharge    Plan for Next Session:  Progress per pt tolerance.     Electronically signed by:  Vik Bello PT

## 2022-01-14 NOTE — PLAN OF CARE
Physical Therapy  [facility]  Rehabilitation and Sports Medicine    [x] [place]  Phone: [phone]  Fax: [phone]      [] [place]  Phone: [phone]  Fax: [phone]    Physical Therapy Progress Note  Date: 2022        Patient Name:  Karol Hdez    :  1990  MRN: [id number]  Restrictions/Precautions:     Medical/Treatment Diagnosis Information:   · Diagnosis: S83.512 L ACL rupture  · Treatment Diagnosis: S83.512 L ACL rupture  Insurance/Certification information:  PT Insurance Information: [facility]  Physician Information:  Referring Practitioner: Yolanda Kinney of care signed (Y/N): y   Visit# / total visits:    Pain level:      7/10     sharp and sore.     Time Period for Report: 21-22  Cancels/No-shows to date:  3/0  Plan of Care/Treatment to date:   [x] Therapeutic Exercise    [] Modalities:  [x] Therapeutic Activity     [] Ultrasound  [] Electrical Stimulation  [x] Gait Training      [] Cervical Traction    [] Lumbar Traction  [x] Neuromuscular Re-education  [] Cold/hotpack [] Iontophoresis  [] Instruction in HEP      Other:  [] Manual Therapy       []    [] Aquatic Therapy       []                    ? Subjective:    Patient reporting no swelling, pain at worst 7/10, 3/10 at best.  Patient noting a feeling of giving out and instability. Patient noting shifting out to the side. Pain with squatting. Amb without device, has limited activity. Patient noting has been unable to do any activity without having more pain and instability. Currently on light duty. Objective:    MIN performed per flow sheet for improved mobility, strengthening, and decrease in pain for improved ambulation and daily living activities. Verbal cueing for sequencing. Difficulty with squats. Added exercises this date with good tolerance.  Initiated retro walking on treadmill this date.       Observation:  12 weeks post onset   Test measurements:  L knee instability    L hip Flex 4/5  L hip abd/add 4+/5  L Knee Ext 4/5  L knee Flex 4- to 4/5   L knee AROM  0-129  R knee AROM 0-145    Assessment:   Patient continues to have difficulty with instability, sig weakness on the left and loss of ROM. Patient demonstrates improvement with gait without device, but can only tolerate standing up to 10-15 min with ADLs. Plan:   Recommend Continue PT 2-3x/week for 6-8 weeks, unless planning further medical management. Goals:    Short term goals  Time Frame for Short term goals: 1 week  Short term goal 1: Start HEP (not met)   Long term goals  Time Frame for Long term goals : 6 weeks  Long term goal 1: L knee pain controlled at 2-3/10 to allow more normal gait (not met)   Long term goal 2: 5-/5 strength for joint stability  Long term goal 3: Step up/ down 8\" steps in reciprocal fashion  Long term goal 4: Tolerate standing, without devices 3-4 hr (not met 10-15min then needs to sit)   Long term goal 5: RTW, starting at modified duty (currently light duty and sitting)            Current Frequency/Duration:1/14/22-3/10/22  # Days per week: [] 1 day # Weeks: [] 1 week [] 4 weeks      [x] 2 days? [] 2 weeks [] 5 weeks      [x] 3 days   [] 3 weeks [x] 8 weeks     Rehab Potential: [] Excellent [] Good [x] Fair  [] Poor     Goal Status:  [] Achieved [] Partially Achieved  [x] Not Achieved     Patient Status: [] Continue per initial plan of Care     [] Patient now discharged     [x] Additional visits requested, Please re-certify for additional visits:      Requested frequency/duration:  2-3X/week for 8 weeks    Electronically signed by:  Sayra Bell PT    If you have any questions or concerns, please don't hesitate to call.   Thank you for your referral.    Physician Signature:________________________________Date:__________________  By signing above, therapists plan is approved by physician

## 2022-01-17 ENCOUNTER — OFFICE VISIT (OUTPATIENT)
Dept: ORTHOPEDIC SURGERY | Age: 32
End: 2022-01-17
Payer: COMMERCIAL

## 2022-01-17 VITALS
WEIGHT: 146 LBS | SYSTOLIC BLOOD PRESSURE: 137 MMHG | HEIGHT: 66 IN | HEART RATE: 92 BPM | BODY MASS INDEX: 23.46 KG/M2 | DIASTOLIC BLOOD PRESSURE: 70 MMHG

## 2022-01-17 DIAGNOSIS — S83.512D RUPTURE OF ANTERIOR CRUCIATE LIGAMENT OF LEFT KNEE, SUBSEQUENT ENCOUNTER: Primary | ICD-10-CM

## 2022-01-17 PROCEDURE — 99214 OFFICE O/P EST MOD 30 MIN: CPT | Performed by: ORTHOPAEDIC SURGERY

## 2022-01-17 PROCEDURE — 99213 OFFICE O/P EST LOW 20 MIN: CPT | Performed by: ORTHOPAEDIC SURGERY

## 2022-01-17 RX ORDER — MELOXICAM 15 MG/1
15 TABLET ORAL DAILY
Qty: 45 TABLET | Refills: 1 | Status: SHIPPED | OUTPATIENT
Start: 2022-01-17 | End: 2022-02-28

## 2022-01-17 NOTE — PROGRESS NOTES
Orthopedic Office Note  [address], [address]  [facility] [address]      CHIEF COMPLAINT:    Chief Complaint   Patient presents with    Knee Pain     re ck left knee-[facility]       HISTORY OF PRESENT ILLNESS:      The patient is a 33 y.o. male  who presents today for follow-up of his left knee work-related partial-thickness ACL tear. He reports that he continues to have pain. He feels as though therapy is improving. He has returned to work seated duty and has more aching at nighttime. Past Medical History:    Past Medical History:   Diagnosis Date    Deep vein thrombosis (DVT) ([facility])     right upper extremity    Ear infection     history of multiple as a child    IV drug abuse ([address]     Oxycontin    Marijuana abuse     Tobacco abuse        Past Surgical History:    Past Surgical History:   Procedure Laterality Date    COLONOSCOPY  2015    Protitis    HAND SURGERY Right        Medications Prior to Admission:   No current outpatient medications on file. No current facility-administered medications for this visit. Allergies:  Patient has no known allergies.     Social History:   Social History     Tobacco Use   Smoking Status Former Smoker    Packs/day: 0.50    Years: 3.00    Pack years: 1.50    Types: Cigarettes    Quit date: 11/15/2016    Years since quittin.1   Smokeless Tobacco Former User    Types: Chew     Social History     Substance and Sexual Activity   Alcohol Use Yes    Alcohol/week: 0.0 standard drinks    Comment: occassional- once every couple months 2 drinks mainly     Social History     Substance and Sexual Activity   Drug Use No    Comment: history of IV OxyContin abuse and marijuana abuse        Family History:  Family History   Problem Relation Age of Onset    Heart Disease Other     Hypertension Other     Asthma Other     Diabetes Paternal Grandmother          REVIEW OF SYSTEMS:  Please see the ROS form attached to today's encounter. I have reviewed it with the patient during the visit. All other systems were reviewed and are negative. PHYSICAL EXAM:  Left knee exam skin is intact. He has no joint effusion. He has no joint line tenderness. He has a negative Chantell's maneuver. Good knee range of motion. Gait today still very guarded but much improved from previous visit. Radiology:      ASSESSMENT/PLAN:  1. Rupture of anterior cruciate ligament of left knee, subsequent encounter        Overall he is making progress. Have recommended additional physical therapy and continue with work restrictions of seated duty work. We will follow-up in 6 weeks to reassess his progress. For the aching that he is having at night I have recommended Mobic 15 mg daily and have reviewed precautions of this medication. No orders of the defined types were placed in this encounter.        Stef Garcia MD

## 2022-01-18 ENCOUNTER — HOSPITAL ENCOUNTER (OUTPATIENT)
Dept: PHYSICAL THERAPY | Age: 32
Setting detail: THERAPIES SERIES
Discharge: HOME | End: 2022-01-18
Payer: COMMERCIAL

## 2022-01-18 PROCEDURE — 97110 THERAPEUTIC EXERCISES: CPT

## 2022-01-18 NOTE — FLOWSHEET NOTE
Physical Therapy Daily Treatment Note    Date:  2022    Patient Name:  Adri Nash    :  1990  MRN: [id number]  Restrictions/Precautions:     Medical/Treatment Diagnosis Information:   · Diagnosis: S83.512 L ACL rupture  · Treatment Diagnosis: S83.512 L ACL rupture  Insurance/Certification information:  PT Insurance Information: [facility]  Physician Information:  Referring Practitioner: Ardelle Lanes of care signed (Y/N): y   Visit# / total visits:    Pain level: 3/10    Time In: 2:27   Time Out: 3:10    Progress Note: [x]  Yes  []  No  Next due by: Visit #12,  (C-9 end date 22)     Subjective:  Patient reporting pain 3/10. Objective:  MIN performed per flow sheet for improved mobility, strengthening, and decrease in pain for improved ambulation and daily living activities. Patient tolerated added 6 inch step on this date. Increased aching with retro walking. No rest breaks required.        Observation:  12 weeks post onset   Test measurements:   L knee instability     L hip Flex 4/5  L hip abd/add 4+/5  L Knee Ext 4/5  L knee Flex 4- to 4/5  L knee 0-129  R knee 0-145     Assessment:   Patient continues to have difficulty with instability, sig weakness on the left and loss of ROM. Patient demonstrates improvement with gait without device, but can only tolerate standing up to 10-15 min with ADLs.          Plan:   Recommend Continue PT 2-3x/week for 6-8 weeks, unless planning further medical management.      Exercises:   Exercise/Equipment Resistance/Repetitions Other comments   Bike 6' Seat 6   L TKE grn 20x    HS curl -sit grn 20x    Squat matrix 5x, 9 position    Step up 6\" 12x ea Fwd/lat   4-way hip 15x ea green    Resist Lateral walk 3 laps at bars  Corporation walk 3 laps at bars, grn    TM retro 4'    single leg heel taps 15x           PKF 15x    Supine Knee slides 15x    SLR 15x    SL hip abduction left 15x    Prone hip ext  15x              IFC     [x] Provided verbal/tactile cueing for activities related to strengthening, flexibility, endurance, ROM. (56925)  [] Provided verbal/tactile cueing for activities related to improving balance, coordination, kinesthetic sense, posture, motor skill, proprioception. (75778)    Therapeutic Activities:     [] Therapeutic activities, direct (one-on-one) patient contact (use of dynamic activities to improve functional performance). (38202)    Gait:   [] Provided training and instruction to the patient for ambulation re-education. (61647)    Self-Care/ADL's  [] Self-care/home management training and compensatory training, meal preparation, safety procedures, and instructions in use of assistive technology devices/adaptive equipment, direct one-on-one contact. (42547)    Home Exercise Program:  T-band TKE & HS curl   [x] Reviewed/Progressed HEP activities related to strengthening, flexibility, endurance, ROM. (23972)  [] Reviewed/Progressed HEP activities related to improving balance, coordination, kinesthetic sense, posture, motor skill, proprioception.  (20584)    Manual Treatments:    [] Provided manual therapy to mobilize soft tissue/joints for the purpose of modulating pain, promoting relaxation,  increasing ROM, reducing/eliminating soft tissue swelling/inflammation/restriction, improving soft tissue extensibility.  (87048)    Service Based Modalities:      Timed Code Treatment Minutes:    37' there ex      Total Treatment Minutes:   37'     Treatment/Activity Tolerance:  [x] Patient tolerated treatment well [] Patient limited by fatique  [] Patient limited by pain  [] Patient limited by other medical complications  [] Other:     Prognosis: [x] Good [] Fair  [] Poor    Patient Requires Follow-up: [x] Yes  [] No      Goals:  Short term goals  Time Frame for Short term goals: 1 week  Short term goal 1: Start HEP (not met)   Long term goals  Time Frame for Long term goals : 6 weeks  Long term goal 1: L knee pain controlled at 2-3/10 to allow more normal gait (not met)   Long term goal 2: 5-/5 strength for joint stability (not met)   Long term goal 3: Step up/ down 8\" steps in reciprocal fashion (guarded completing 6\" steps with increased pain)  Long term goal 4: Tolerate standing, without devices 3-4 hr (not met 10-15min then needs to sit)   Long term goal 5: RTW, starting at modified duty (currently light duty and sitting)         Plan:   [x] Continue per plan of care [] Alter current plan (see comments)  [] Plan of care initiated [] Hold pending MD visit [] Discharge    Plan for Next Session:  Progress per pt tolerance.     Electronically signed by:  Anamika Mckeon PTA

## 2022-01-19 ENCOUNTER — HOSPITAL ENCOUNTER (OUTPATIENT)
Dept: PHYSICAL THERAPY | Age: 32
Setting detail: THERAPIES SERIES
Discharge: HOME | End: 2022-01-19
Payer: COMMERCIAL

## 2022-01-19 PROCEDURE — 97110 THERAPEUTIC EXERCISES: CPT

## 2022-01-19 NOTE — FLOWSHEET NOTE
Physical Therapy Daily Treatment Note    Date:  2022    Patient Name:  Michelle Aquino    :  1990  MRN: [id number]  Restrictions/Precautions:     Medical/Treatment Diagnosis Information:   · Diagnosis: S83.512 L ACL rupture  · Treatment Diagnosis: S83.512 L ACL rupture  Insurance/Certification information:  PT Insurance Information: [facility]  Physician Information:  Referring Practitioner: Carrillo Ndiaye of care signed (Y/N): y   Visit# / total visits:    Pain level: 4/10    Time In: 4:01  Time Out: 4:41    Progress Note: [x]  Yes  []  No  Next due by: Visit #12,  (C-9 end date 22)     Subjective:  Patient reporting 4/10 pain on this date. Patient states his knee is still feeling unstable and would like to continue with therapy. Patient wears a brace to session. Objective:  MIN performed per flow sheet for improved mobility, strengthening, and decrease in pain for improved ambulation and daily living activities. Added 1/2 kneeling on foam and 4 inch. Added 8 inch for forward stepping with increased pain. Increased incline on TM. Observation:  12 weeks post onset   Test measurements:   L knee instability     L hip Flex 4/5  L hip abd/add 4+/5  L Knee Ext 4/5  L knee Flex 4- to 4/5  L knee 0-129  R knee 0-145     Assessment:   Patient continues to have difficulty with instability, sig weakness on the left and loss of ROM. Patient demonstrates improvement with gait without device, but can only tolerate standing up to 10-15 min with ADLs.            Plan:   Recommend Continue PT 2-3x/week for 6-8 weeks, unless planning further medical management.      Exercises:   Exercise/Equipment Resistance/Repetitions Other comments   Bike 6' Seat 6   L TKE grn 20x    HS curl -sit grn 20x    Squat matrix 10x, 9 position    Step up 8\" 12x ea Fwd/lat   4-way hip 15x ea green    Resist Lateral walk 3 laps at bars [facility] walk 3 laps at bars, grn    TM retro 4' Incline 2.0, 0.9 mph   single leg heel taps term goals: 1 week  Short term goal 1: Start HEP (not met)   Long term goals  Time Frame for Long term goals : 6 weeks  Long term goal 1: L knee pain controlled at 2-3/10 to allow more normal gait (not met)   Long term goal 2: 5-/5 strength for joint stability (not met)   Long term goal 3: Step up/ down 8\" steps in reciprocal fashion (guarded completing 6\" steps with increased pain)  Long term goal 4: Tolerate standing, without devices 3-4 hr (not met 10-15min then needs to sit)   Long term goal 5: RTW, starting at modified duty (currently light duty and sitting)         Plan:   [x] Continue per plan of care [] Alter current plan (see comments)  [] Plan of care initiated [] Hold pending MD visit [] Discharge    Plan for Next Session:  Progress per pt tolerance.     Electronically signed by:  Zaire Hinojosa PTA

## 2022-01-24 ENCOUNTER — HOSPITAL ENCOUNTER (OUTPATIENT)
Dept: PHYSICAL THERAPY | Age: 32
Setting detail: THERAPIES SERIES
Discharge: HOME | End: 2022-01-24
Payer: COMMERCIAL

## 2022-01-24 PROCEDURE — 97110 THERAPEUTIC EXERCISES: CPT

## 2022-01-24 NOTE — FLOWSHEET NOTE
Physical Therapy Daily Treatment Note    Date:  2022    Patient Name:  Tod Zavala    :  1990  MRN: [id number]  Restrictions/Precautions:     Medical/Treatment Diagnosis Information:   · Diagnosis: S83.512 L ACL rupture  · Treatment Diagnosis: S83.512 L ACL rupture  Insurance/Certification information:  PT Insurance Information: [id number]  Physician Information:  Referring Practitioner: Rosalba Coreas of care signed (Y/N): y   Visit# / total visits: 10/12   Pain level: 3/10    Time In: 3:10  Time Out: 3:58    Progress Note: []  Yes  [x]  No  Next due by: Visit #12,  (C-9 end date 22)     Subjective:  Patient reporting 3/10 pain on this date. Patient states his knee is still feeling unstable and would like to continue with therapy. Patient wears a brace to session however down on ankle. Objective:  MIN performed per flow sheet for improved mobility, strengthening, and decrease in pain for improved ambulation and daily living activities. Added exercises with good tolerance    Observation:  12 weeks post onset   Test measurements:   L knee instability     L hip Flex 4/5  L hip abd/add 4+/5  L Knee Ext 4/5  L knee Flex 4- to 4/5  L knee 0-129  R knee 0-145     Assessment:   Patient continues to have difficulty with instability, sig weakness on the left and loss of ROM. Patient demonstrates improvement with gait without device, but can only tolerate standing up to 10-15 min with ADLs.            Plan:   Recommend Continue PT 2-3x/week for 6-8 weeks, unless planning further medical management.      Exercises:   Exercise/Equipment Resistance/Repetitions Other comments   Bike 6' Seat 6   L TKE grn 20x    HS curl -sit grn 20x    Squat matrix 10x, 9 position    Step up 8\" 12x ea Fwd/lat   4-way hip 15x ea green    Resist Lateral walk 3 laps at bars  [employer] walk 3 laps at bars, grn    TM retro 4' Incline 2.0, 0.9 mph   single leg heel taps 15x      1/2 kneeling  4 inch, foam x10    PKF 15x    Supine Knee slides 15x    SLR 15x    SL hip abduction left 15x    Prone hip ext  15x    Crate lift 10x     10#         IFC     [x] Provided verbal/tactile cueing for activities related to strengthening, flexibility, endurance, ROM. (28784)  [] Provided verbal/tactile cueing for activities related to improving balance, coordination, kinesthetic sense, posture, motor skill, proprioception. (19206)    Therapeutic Activities:     [] Therapeutic activities, direct (one-on-one) patient contact (use of dynamic activities to improve functional performance). (40295)    Gait:   [] Provided training and instruction to the patient for ambulation re-education. (99617)    Self-Care/ADL's  [] Self-care/home management training and compensatory training, meal preparation, safety procedures, and instructions in use of assistive technology devices/adaptive equipment, direct one-on-one contact. (78984)    Home Exercise Program:  T-band TKE & HS curl   [x] Reviewed/Progressed HEP activities related to strengthening, flexibility, endurance, ROM. (26560)  [] Reviewed/Progressed HEP activities related to improving balance, coordination, kinesthetic sense, posture, motor skill, proprioception.  (22181)    Manual Treatments:    [] Provided manual therapy to mobilize soft tissue/joints for the purpose of modulating pain, promoting relaxation,  increasing ROM, reducing/eliminating soft tissue swelling/inflammation/restriction, improving soft tissue extensibility.  (87245)    Service Based Modalities:      Timed Code Treatment Minutes:    50' there ex      Total Treatment Minutes:   50'     Treatment/Activity Tolerance:  [x] Patient tolerated treatment well [] Patient limited by fatique  [] Patient limited by pain  [] Patient limited by other medical complications  [] Other:     Prognosis: [x] Good [] Fair  [] Poor    Patient Requires Follow-up: [x] Yes  [] No      Goals:  Short term goals  Time Frame for Short term goals: 1 week  Short term goal 1: Start HEP (not met)   Long term goals  Time Frame for Long term goals : 6 weeks  Long term goal 1: L knee pain controlled at 2-3/10 to allow more normal gait    Long term goal 2: 5-/5 strength for joint stability   Long term goal 3: Step up/ down 8\" steps in reciprocal fashion   Long term goal 4: Tolerate standing, without devices 3-4 hr    Long term goal 5: RTW, starting at modified duty          Plan:   [x] Continue per plan of care [] Alter current plan (see comments)  [] Plan of care initiated [] Hold pending MD visit [] Discharge    Plan for Next Session:  Progress per pt tolerance.     Electronically signed by:  Colt Brooke PTA

## 2022-01-27 ENCOUNTER — APPOINTMENT (OUTPATIENT)
Dept: PHYSICAL THERAPY | Age: 32
End: 2022-01-27
Payer: COMMERCIAL

## 2022-02-02 ENCOUNTER — APPOINTMENT (OUTPATIENT)
Dept: PHYSICAL THERAPY | Age: 32
End: 2022-02-02
Payer: COMMERCIAL

## 2022-02-04 ENCOUNTER — APPOINTMENT (OUTPATIENT)
Dept: PHYSICAL THERAPY | Age: 32
End: 2022-02-04
Payer: COMMERCIAL

## 2022-02-09 ENCOUNTER — HOSPITAL ENCOUNTER (OUTPATIENT)
Dept: PHYSICAL THERAPY | Age: 32
Setting detail: THERAPIES SERIES
Discharge: HOME | End: 2022-02-09
Payer: COMMERCIAL

## 2022-02-09 PROCEDURE — 97110 THERAPEUTIC EXERCISES: CPT | Performed by: PHYSICAL THERAPIST

## 2022-02-09 NOTE — FLOWSHEET NOTE
Physical Therapy Daily Treatment Note    Date:  2022    Patient Name:  Sharri Cantu    :  1990  MRN: [id number]  Restrictions/Precautions:     Medical/Treatment Diagnosis Information:   · Diagnosis: S83.512 L ACL rupture  · Treatment Diagnosis: S83.512 L ACL rupture  Insurance/Certification information:  PT Insurance Information: [facility]  Physician Information:  Referring Practitioner: Julieta Guardado of care signed (Y/N): y   Visit# / total visits:    Pain level: 310    Time In: 12:46  Time Out: 1:33    Progress Note: []  Yes  [x]  No  Next due by: Visit #12,  (C-9 end date 22)     Subjective:  \"I feel like I've been able to move more normally, and able to do more throughout my day. But I still have a sensation of something moving in my knee and on the back of my leg. It sort of feels like a sock slipping down, even though I'm not wearing a sock. \"      Objective:  MIN performed per flow sheet for improved mobility, strengthening, and decrease in pain for improved ambulation and daily living activities. Added exercises with good tolerance    Observation:  12 weeks post onset   Test measurements:   L knee instability     L hip Flex 4/5  L hip abd/add 4+/5  L Knee Ext 4/5  L knee Flex 4- to 4/5  L knee 0-129  R knee 0-145     Assessment:   Patient continues to have difficulty with instability, sig weakness on the left and loss of ROM. Patient demonstrates improvement with gait without device, but can only tolerate standing up to 10-15 min with ADLs.            Plan:   Recommend Continue PT 2-3x/week for 6-8 weeks, unless planning further medical management.      Exercises:   Exercise/Equipment Resistance/Repetitions Other comments   Bike 6' Seat 6   L TKE grn 20x    HS curl -sit grn 20x    Squat matrix 10x, 9 position    Step up 8\" 12x ea Fwd/lat   4-way hip 15x ea green    Resist Lateral walk 3 laps at bars [facility] walk 3 laps at bars, grn    TM retro  Incline 2.0, 0.9 mph   single leg heel taps 15x      1/2 kneeling  4 inch, foam x10    PKF 15x    Supine Knee slides 15x    SLR 15x    SL hip abduction left 15x    Prone hip ext  15x    Crate lift 10x     10#         IFC     [x] Provided verbal/tactile cueing for activities related to strengthening, flexibility, endurance, ROM. (65154)  [] Provided verbal/tactile cueing for activities related to improving balance, coordination, kinesthetic sense, posture, motor skill, proprioception. (03920)    Therapeutic Activities:     [] Therapeutic activities, direct (one-on-one) patient contact (use of dynamic activities to improve functional performance). (26157)    Gait:   [] Provided training and instruction to the patient for ambulation re-education. (70307)    Self-Care/ADL's  [] Self-care/home management training and compensatory training, meal preparation, safety procedures, and instructions in use of assistive technology devices/adaptive equipment, direct one-on-one contact. (07916)    Home Exercise Program:  T-band TKE & HS curl   [x] Reviewed/Progressed HEP activities related to strengthening, flexibility, endurance, ROM. (96216)  [] Reviewed/Progressed HEP activities related to improving balance, coordination, kinesthetic sense, posture, motor skill, proprioception.  (28684)    Manual Treatments:    [] Provided manual therapy to mobilize soft tissue/joints for the purpose of modulating pain, promoting relaxation,  increasing ROM, reducing/eliminating soft tissue swelling/inflammation/restriction, improving soft tissue extensibility.  (22074)    Service Based Modalities:      Timed Code Treatment Minutes:    52' there ex      Total Treatment Minutes:   52'     Treatment/Activity Tolerance:  [x] Patient tolerated treatment well [] Patient limited by fatique  [] Patient limited by pain  [] Patient limited by other medical complications  [] Other:     Prognosis: [x] Good [] Fair  [] Poor    Patient Requires Follow-up: [x] Yes  [] No      Goals:  Short term goals  Time Frame for Short term goals: 1 week  Short term goal 1: Start HEP (not met)   Long term goals  Time Frame for Long term goals : 6 weeks  Long term goal 1: L knee pain controlled at 2-3/10 to allow more normal gait    Long term goal 2: 5-/5 strength for joint stability   Long term goal 3: Step up/ down 8\" steps in reciprocal fashion   Long term goal 4: Tolerate standing, without devices 3-4 hr    Long term goal 5: RTW, starting at modified duty          Plan:   [x] Continue per plan of care [] Alter current plan (see comments)  [] Plan of care initiated [] Hold pending MD visit [] Discharge    Plan for Next Session:  Progress per pt tolerance.     Electronically signed by:  Rosie aCceres, PT, DPT

## 2022-02-22 ENCOUNTER — HOSPITAL ENCOUNTER (OUTPATIENT)
Dept: PHYSICAL THERAPY | Age: 32
Setting detail: THERAPIES SERIES
Discharge: HOME | End: 2022-02-22
Payer: COMMERCIAL

## 2022-02-22 PROCEDURE — 97110 THERAPEUTIC EXERCISES: CPT

## 2022-02-22 NOTE — FLOWSHEET NOTE
Physical Therapy Daily Treatment Note    Date:  2022    Patient Name:  Jayla Scott    :  1990  MRN: [id number]  Restrictions/Precautions:     Medical/Treatment Diagnosis Information:   · Diagnosis: S83.512 L ACL rupture  · Treatment Diagnosis: S83.512 L ACL rupture  Insurance/Certification information:  PT Insurance Information: [id number]  Physician Information:  Referring Practitioner: Paloma Ramirez of care signed (Y/N): y   Visit# / total visits:  2nd POC 13 total   Pain level: 3/10    Time In: 2:30  Time Out: 3:15    Progress Note: []  Yes  [x]  No  Next due by: Visit #12,  (C-9 end date 22)     Subjective: Patient states his knee is feeling the same. Does not feel stable on it. Notices it is the most unstable when he is just standing and or pivoting on it. Cannot tolerate standing in one spot for long. He props his leg up while he is at work to give it a break. Has been trying to do more walking at home and it is not going well. Objective:  MIN performed per flow sheet for improved mobility, strengthening, and decrease in pain for improved ambulation and daily living activities. Patient challenged throughout session noting increased difficulty with steps and squats. Observation: No longer ambulating with antalgic gait pattern. Test measurements:      Exercises:   Exercise/Equipment Resistance/Repetitions Other comments   Bike 6' Seat 6   L TKE grn 20x    HS curl -sit grn 20x    Squat matrix 10x, 9 positions    Step up 8\" 12x ea Fwd/lat/retro   4-way hip 15x ea green    Resist Lateral walk 3 laps at bars [facility] walk 3 laps at bars, grn    TM retro 4' Incline 3.5, 1.5 mph   single leg heel taps 15x      1/2 kneeling  foam x10    PKF 15x    Supine Knee slides 15x    SLR 15x    SL hip abduction left 15x    Prone hip ext  15x    Crate lift 10x     10#         IFC     [x] Provided verbal/tactile cueing for activities related to strengthening, flexibility, endurance, ROM. (20493)  [] Provided verbal/tactile cueing for activities related to improving balance, coordination, kinesthetic sense, posture, motor skill, proprioception. (55468)    Therapeutic Activities:     [] Therapeutic activities, direct (one-on-one) patient contact (use of dynamic activities to improve functional performance). (64303)    Gait:   [] Provided training and instruction to the patient for ambulation re-education. (35015)    Self-Care/ADL's  [] Self-care/home management training and compensatory training, meal preparation, safety procedures, and instructions in use of assistive technology devices/adaptive equipment, direct one-on-one contact. (69063)    Home Exercise Program:  T-band TKE & HS curl   [x] Reviewed/Progressed HEP activities related to strengthening, flexibility, endurance, ROM. (35693)  [] Reviewed/Progressed HEP activities related to improving balance, coordination, kinesthetic sense, posture, motor skill, proprioception.  (42395)    Manual Treatments:    [] Provided manual therapy to mobilize soft tissue/joints for the purpose of modulating pain, promoting relaxation,  increasing ROM, reducing/eliminating soft tissue swelling/inflammation/restriction, improving soft tissue extensibility.  (37338)    Service Based Modalities:      Timed Code Treatment Minutes:    39' there ex      Total Treatment Minutes:   39'     Treatment/Activity Tolerance:  [x] Patient tolerated treatment well [] Patient limited by fatique  [] Patient limited by pain  [] Patient limited by other medical complications  [] Other:     Prognosis: [x] Good [] Fair  [] Poor    Patient Requires Follow-up: [x] Yes  [] No      Goals:  Short term goals  Time Frame for Short term goals: 1 week  Short term goal 1: Start HEP (not met)   Long term goals  Time Frame for Long term goals : 6 weeks  Long term goal 1: L knee pain controlled at 2-3/10 to allow more normal gait    Long term goal 2: 5-/5 strength for joint stability   Long term goal 3: Step up/ down 8\" steps in reciprocal fashion (step ups with 8 inch step)  Long term goal 4: Tolerate standing, without devices 3-4 hr  (unable)  Long term goal 5: RTW, starting at modified duty  (working in sit down position)        Plan:   [x] Continue per plan of care [] Alter current plan (see comments)  [] Plan of care initiated [] Hold pending MD visit [] Discharge    Plan for Next Session:  Progress per pt tolerance.     Electronically signed by:  Mary Mckeon PTA

## 2022-02-25 ENCOUNTER — HOSPITAL ENCOUNTER (OUTPATIENT)
Dept: PHYSICAL THERAPY | Age: 32
Setting detail: THERAPIES SERIES
Discharge: HOME | End: 2022-02-25
Payer: COMMERCIAL

## 2022-02-25 PROCEDURE — 97110 THERAPEUTIC EXERCISES: CPT | Performed by: PHYSICAL THERAPIST

## 2022-02-25 NOTE — FLOWSHEET NOTE
Physical Therapy Daily Treatment Note    Date:  2022    Patient Name:  Lashanda Roque    :  1990  MRN: [id number]  Restrictions/Precautions:     Medical/Treatment Diagnosis Information:   · Diagnosis: S83.512 L ACL rupture  · Treatment Diagnosis: S83.512 L ACL rupture  Insurance/Certification information:  PT Insurance Information: [facility] ( 12 visits)  Physician Information:  Referring Practitioner: Alissa Tapia of care signed (Y/N): y   Visit# / total visits:  of 2nd POC , 14 total   Pain level: 3/10    Time In: 2:00  Time Out: 2:42    Progress Note: []  Yes  [x]  No  Next due by: Visit #12,  (C-9 end date 22)     Subjective: L knee eduardo several times a day. Mostly in pivoting situations. Getting to the point that the R knee is getting sore due to constant transfer of weight to that side. Observation:   Is back to work, in modified duty status  Test measurements:      Exercises:   Exercise/Equipment Resistance/Repetitions Other comments   Bike 6' Seat 6   L TKE grn 20x    HS curl -sit grn 20x    Squat matrix 10x, 9 positions    Step up 8\" 12x ea Fwd/lat/retro   4-way hip 15x ea green    Resist Lateral walk 3 laps at bars [employer] walk 3 laps at bars, grn    TM retro 4' Incline 3.5, 1.5 mph   single leg heel taps 15x      1/2 kneeling  foam x10    PKF 15x    Supine Knee slides     SLR     SL hip abduction left 15x    Prone hip ext  15x    Crate lift 10x     10#         IFC     [x] Provided verbal/tactile cueing for activities related to strengthening, flexibility, endurance, ROM. (85096)  [] Provided verbal/tactile cueing for activities related to improving balance, coordination, kinesthetic sense, posture, motor skill, proprioception. (00490)    Therapeutic Activities:     [] Therapeutic activities, direct (one-on-one) patient contact (use of dynamic activities to improve functional performance).  (60534)    Gait:   [] Provided training and instruction to the patient for ambulation re-education. (04018)    Self-Care/ADL's  [] Self-care/home management training and compensatory training, meal preparation, safety procedures, and instructions in use of assistive technology devices/adaptive equipment, direct one-on-one contact. (22704)    Home Exercise Program:  T-band TKE & HS curl   [x] Reviewed/Progressed HEP activities related to strengthening, flexibility, endurance, ROM. (38915)  [] Reviewed/Progressed HEP activities related to improving balance, coordination, kinesthetic sense, posture, motor skill, proprioception.  (06916)    Manual Treatments:    [] Provided manual therapy to mobilize soft tissue/joints for the purpose of modulating pain, promoting relaxation,  increasing ROM, reducing/eliminating soft tissue swelling/inflammation/restriction, improving soft tissue extensibility.  (10547)    Service Based Modalities:      Timed Code Treatment Minutes:    43' there ex      Total Treatment Minutes:   43'     Treatment/Activity Tolerance:  [x] Patient tolerated treatment well [] Patient limited by fatique  [] Patient limited by pain  [] Patient limited by other medical complications  [] Other:     Prognosis: [x] Good [] Fair  [] Poor    Patient Requires Follow-up: [x] Yes  [] No      Goals:  Short term goals  Time Frame for Short term goals: 1 week  Short term goal 1: Start HEP- met   Long term goals  Time Frame for Long term goals : 6 weeks  Long term goal 1: L knee pain controlled at 2-3/10 to allow more normal gait    Long term goal 2: 5-/5 strength for joint stability   Long term goal 3: Step up/ down 8\" steps in reciprocal fashion (step ups with 8 inch step)  Long term goal 4: Tolerate standing, without devices 3-4 hr  (unable)  Long term goal 5: RTW, starting at modified duty  (working in sit down position)        Plan:   [x] Continue per plan of care [] Alter current plan (see comments)  [] Plan of care initiated [] Hold pending MD visit [] Discharge    Plan for Next Session:  Progress per pt tolerance.     Electronically signed by:  Kishore Robledo PT

## 2022-02-28 ENCOUNTER — OFFICE VISIT (OUTPATIENT)
Dept: ORTHOPEDIC SURGERY | Age: 32
End: 2022-02-28
Payer: COMMERCIAL

## 2022-02-28 VITALS
WEIGHT: 146 LBS | HEART RATE: 84 BPM | HEIGHT: 66 IN | DIASTOLIC BLOOD PRESSURE: 77 MMHG | BODY MASS INDEX: 23.46 KG/M2 | SYSTOLIC BLOOD PRESSURE: 130 MMHG

## 2022-02-28 DIAGNOSIS — S83.512D RUPTURE OF ANTERIOR CRUCIATE LIGAMENT OF LEFT KNEE, SUBSEQUENT ENCOUNTER: Primary | ICD-10-CM

## 2022-02-28 PROCEDURE — 99214 OFFICE O/P EST MOD 30 MIN: CPT | Performed by: ORTHOPAEDIC SURGERY

## 2022-02-28 PROCEDURE — 99213 OFFICE O/P EST LOW 20 MIN: CPT | Performed by: ORTHOPAEDIC SURGERY

## 2022-02-28 RX ORDER — DICLOFENAC SODIUM 75 MG/1
75 TABLET, DELAYED RELEASE ORAL 2 TIMES DAILY
Qty: 90 TABLET | Refills: 0 | Status: SHIPPED | OUTPATIENT
Start: 2022-02-28 | End: 2022-04-11

## 2022-02-28 NOTE — PROGRESS NOTES
Orthopedic Office Note  Formerly [facility], [facility]  [address]  [address], [address]  [facility] [address]      CHIEF COMPLAINT:    Chief Complaint   Patient presents with    Knee Pain     rech left knee       HISTORY OF PRESENT ILLNESS:      The patient is a 33 y.o. male  who presents today for follow-up of his left knee work-related injury. He reports he feels as though he is not making progress. He continues to have knee pain that he localizes more anterior in location. He also gets an odd sensation that he feels as though his sock is falling down the back of his leg. Past Medical History:    Past Medical History:   Diagnosis Date    Deep vein thrombosis (DVT) ([facility])     right upper extremity    Ear infection     history of multiple as a child    IV drug abuse ([address]     Oxycontin    Marijuana abuse     Tobacco abuse        Past Surgical History:    Past Surgical History:   Procedure Laterality Date    COLONOSCOPY  2015    Protitis    HAND SURGERY Right        Medications Prior to Admission:   Current Outpatient Medications   Medication Sig Dispense Refill    diclofenac (VOLTAREN) 75 MG EC tablet Take 1 tablet by mouth 2 times daily Take with food. 90 tablet 0     No current facility-administered medications for this visit. Allergies:  Patient has no known allergies.     Social History:   Social History     Tobacco Use   Smoking Status Former Smoker    Packs/day: 0.50    Years: 3.00    Pack years: 1.50    Types: Cigarettes    Quit date: 11/15/2016    Years since quittin.2   Smokeless Tobacco Former User    Types: Chew     Social History     Substance and Sexual Activity   Alcohol Use Yes    Alcohol/week: 0.0 standard drinks    Comment: occassional- once every couple months 2 drinks mainly     Social History     Substance and Sexual Activity   Drug Use No    Comment: history of IV OxyContin abuse and marijuana abuse        Family History:  Family History   Problem Relation Age of Onset    Heart Disease Other     Hypertension Other     Asthma Other     Diabetes Paternal Grandmother          REVIEW OF SYSTEMS:  Please see the ROS form attached to today's encounter. I have reviewed it with the patient during the visit. All other systems were reviewed and are negative. PHYSICAL EXAM:  Examination today of his left knee reveals no joint effusion. He has full knee range of motion. He has a negative Lachman's test.  He has a negative Chantell's maneuver. His gait is much improved from his previous visit and watching him walk within the room and is nonantalgic. Radiology:      ASSESSMENT/PLAN:  1. Rupture of anterior cruciate ligament of left knee, subsequent encounter        Given this persistent pain we will switch him to diclofenac 75 mg twice a day. He will finish off his additional 10 visits of physical therapy. We will see him at that time to reassess his progress. We will keep him on seated duty work. I have discussed that his recovery is taking longer than I would have expected. If symptoms persist we have discussed the option of potentially repeating an MRI scan. No orders of the defined types were placed in this encounter.        Hayden Cruz MD

## 2022-03-01 ENCOUNTER — HOSPITAL ENCOUNTER (OUTPATIENT)
Dept: PHYSICAL THERAPY | Age: 32
Setting detail: THERAPIES SERIES
Discharge: HOME | End: 2022-03-01
Payer: COMMERCIAL

## 2022-03-01 PROCEDURE — 97110 THERAPEUTIC EXERCISES: CPT

## 2022-03-01 NOTE — PROGRESS NOTES
I have reviewed and agree to the content of the note written by the PTA.   Electronically signed by Corinne Idler PT [id number]

## 2022-03-01 NOTE — FLOWSHEET NOTE
Physical Therapy Daily Treatment Note    Date:  3/1/2022    Patient Name:  Laverne Block    :  1990  MRN: [id number]  Restrictions/Precautions:     Medical/Treatment Diagnosis Information:   · Diagnosis: S83.512 L ACL rupture  · Treatment Diagnosis: S83.512 L ACL rupture  Insurance/Certification information:  PT Insurance Information: [facility] ( 12 visits)  Physician Information:  Referring Practitioner: Emanuel Shanks of care signed (Y/N): y   Visit# / total visits: 3/12 of 2nd POC , 15 total   Pain level: 5/10    Time In: 1:46  Time Out: 2:29    Progress Note: []  Yes  [x]  No  Next due by: Visit #12,  (C-9 end date 22)     Subjective: Patient states everything has been the same. Doctor wants him to stay with his 10 more visits and then return back. Patient is complaining of increased pain this date. Objective: Patient requested to hold on the crate lift this date due to the increased pain it caused after last session. Hesitant to perform single leg heel taps off of 6 inch step. Verbal instructions and education provided throughout.       Observation:   Test measurements:      Exercises:   Exercise/Equipment Resistance/Repetitions Other comments   Bike 6' Seat 6   L TKE grn 20x    HS curl -sit grn 20x    Squat matrix 10x, 9 positions    Step up 8\" 15x ea Fwd/lat/retro   4-way hip 15x ea green    Resist Lateral walk 3 laps at bars [facility] walk 3 laps at bars, grn    TM retro 4' Incline 3.5, 1.5 mph   single leg heel taps 15x      1/2 kneeling  foam x10    PKF 15x    Supine Knee slides     SLR     SL hip abduction left 15x    Prone hip ext  15x    Crate lift          IFC     [x] Provided verbal/tactile cueing for activities related to strengthening, flexibility, endurance, ROM. (48976)  [] Provided verbal/tactile cueing for activities related to improving balance, coordination, kinesthetic sense, posture, motor skill, proprioception. (32857)    Therapeutic Activities:     [] Therapeutic activities, direct (one-on-one) patient contact (use of dynamic activities to improve functional performance). (69027)    Gait:   [] Provided training and instruction to the patient for ambulation re-education. (67427)    Self-Care/ADL's  [] Self-care/home management training and compensatory training, meal preparation, safety procedures, and instructions in use of assistive technology devices/adaptive equipment, direct one-on-one contact. (67846)    Home Exercise Program:  T-band TKE & HS curl   [x] Reviewed/Progressed HEP activities related to strengthening, flexibility, endurance, ROM. (61439)  [] Reviewed/Progressed HEP activities related to improving balance, coordination, kinesthetic sense, posture, motor skill, proprioception.  (52512)    Manual Treatments:    [] Provided manual therapy to mobilize soft tissue/joints for the purpose of modulating pain, promoting relaxation,  increasing ROM, reducing/eliminating soft tissue swelling/inflammation/restriction, improving soft tissue extensibility.  (51829)    Service Based Modalities:      Timed Code Treatment Minutes:    37' there ex      Total Treatment Minutes:   37'     Treatment/Activity Tolerance:  [x] Patient tolerated treatment well [] Patient limited by fatique  [] Patient limited by pain  [] Patient limited by other medical complications  [] Other:     Prognosis: [x] Good [] Fair  [] Poor    Patient Requires Follow-up: [x] Yes  [] No      Goals:  Short term goals  Time Frame for Short term goals: 1 week  Short term goal 1: Start HEP- met   Long term goals  Time Frame for Long term goals : 6 weeks  Long term goal 1: L knee pain controlled at 2-3/10 to allow more normal gait    Long term goal 2: 5-/5 strength for joint stability   Long term goal 3: Step up/ down 8\" steps in reciprocal fashion (step ups with 8 inch step)  Long term goal 4: Tolerate standing, without devices 3-4 hr  (unable)  Long term goal 5: RTW, starting at modified duty  (working in sit down position)        Plan:   [x] Continue per plan of care [] Alter current plan (see comments)  [] Plan of care initiated [] Hold pending MD visit [] Discharge    Plan for Next Session:  Progress per pt tolerance.     Electronically signed by:  Teri Chrsity PTA

## 2022-03-04 ENCOUNTER — HOSPITAL ENCOUNTER (OUTPATIENT)
Dept: PHYSICAL THERAPY | Age: 32
Setting detail: THERAPIES SERIES
Discharge: HOME | End: 2022-03-04
Payer: COMMERCIAL

## 2022-03-04 PROCEDURE — 97110 THERAPEUTIC EXERCISES: CPT

## 2022-03-04 NOTE — FLOWSHEET NOTE
Physical Therapy Daily Treatment Note    Date:  3/4/2022    Patient Name:  Brady Bey    :  1990  MRN: [id number]  Restrictions/Precautions:     Medical/Treatment Diagnosis Information:   · Diagnosis: S83.512 L ACL rupture  · Treatment Diagnosis: S83.512 L ACL rupture  Insurance/Certification information:  PT Insurance Information: [facility] ( 12 visits)  Physician Information:  Referring Practitioner: Loan Barajas of care signed (Y/N): y   Visit# / total visits:  of 2nd POC , 16 total   Pain level: 5/10    Time In: 2:00  Time Out: 2:58    Progress Note: []  Yes  [x]  No  Next due by: Visit #12,  (C-9 end date 22)     Subjective: Patient states everything has been the same, no changes since last visit. Patient notes experiencing increased pain due to walking more. Objective: Patient attempted heel taps this date with 6\" and 4\" steps, but reported increased pain to 7/10. Heel taps performed with 2\" step. Pt initially requested to hold crate lift ex, but agreed to perform without 10# weight. Pt hesitant to perform all exercises this date, requiring extra time to perform. Verbal instructions and education provided throughout. Pt notes increased pain and discomfort in knee 5-6/10 at end of session.       RTD: 22    Observation:   Test measurements:      Exercises:   Exercise/Equipment Resistance/Repetitions Other comments   Bike 6' Seat 6   L TKE grn 20x    HS curl -sit grn 20x    Squat matrix 10x, 9 positions    Step up 8\" 15x ea Fwd/lat/retro   4-way hip 15x ea green    Resist Lateral walk 3 laps at bars [facility] walk 3 laps at bars, grn    TM retro 4' Incline 3.5, 1.5 mph   single leg heel taps 10x 2\"     1/2 kneeling  foam x10    PKF    Supine Knee slides     SLR     SL hip abduction left     Prone hip ext      Crate lift 10x              IFC     [x] Provided verbal/tactile cueing for activities related to strengthening, flexibility, endurance, ROM. (70677)  [] Provided verbal/tactile cueing for activities related to improving balance, coordination, kinesthetic sense, posture, motor skill, proprioception. (35165)    Therapeutic Activities:     [] Therapeutic activities, direct (one-on-one) patient contact (use of dynamic activities to improve functional performance). (29457)    Gait:   [] Provided training and instruction to the patient for ambulation re-education. (83787)    Self-Care/ADL's  [] Self-care/home management training and compensatory training, meal preparation, safety procedures, and instructions in use of assistive technology devices/adaptive equipment, direct one-on-one contact. (81750)    Home Exercise Program:  T-band TKE & HS curl   [x] Reviewed/Progressed HEP activities related to strengthening, flexibility, endurance, ROM. (25363)  [] Reviewed/Progressed HEP activities related to improving balance, coordination, kinesthetic sense, posture, motor skill, proprioception.  (57333)    Manual Treatments:    [] Provided manual therapy to mobilize soft tissue/joints for the purpose of modulating pain, promoting relaxation,  increasing ROM, reducing/eliminating soft tissue swelling/inflammation/restriction, improving soft tissue extensibility.  (80864)    Service Based Modalities:      Timed Code Treatment Minutes:    62' there ex      Total Treatment Minutes:   62'     Treatment/Activity Tolerance:  [x] Patient tolerated treatment well [] Patient limited by fatique  [] Patient limited by pain  [] Patient limited by other medical complications  [] Other:     Prognosis: [x] Good [] Fair  [] Poor    Patient Requires Follow-up: [x] Yes  [] No      Goals:  Short term goals  Time Frame for Short term goals: 1 week  Short term goal 1: Start HEP- met   Long term goals  Time Frame for Long term goals : 6 weeks  Long term goal 1: L knee pain controlled at 2-3/10 to allow more normal gait    Long term goal 2: 5-/5 strength for joint stability   Long term goal 3: Step up/ down 8\" steps in reciprocal fashion (step ups with 8 inch step)  Long term goal 4: Tolerate standing, without devices 3-4 hr  (unable)  Long term goal 5: RTW, starting at modified duty  (working in sit down position)        Plan:   [x] Continue per plan of care [] Alter current plan (see comments)  [] Plan of care initiated [] Hold pending MD visit [] Discharge    Plan for Next Session:  Progress per pt tolerance.     Electronically signed by:  Cheri Torres PTA

## 2022-03-08 ENCOUNTER — HOSPITAL ENCOUNTER (OUTPATIENT)
Dept: PHYSICAL THERAPY | Age: 32
Setting detail: THERAPIES SERIES
Discharge: HOME | End: 2022-03-08
Payer: COMMERCIAL

## 2022-03-08 PROCEDURE — 97110 THERAPEUTIC EXERCISES: CPT

## 2022-03-09 NOTE — PROGRESS NOTES
I have reviewed and agree to the content of the note written by the PTA.   Electronically signed by Naomi Calderon PT [id number]

## 2022-03-10 ENCOUNTER — HOSPITAL ENCOUNTER (OUTPATIENT)
Dept: PHYSICAL THERAPY | Age: 32
Setting detail: THERAPIES SERIES
Discharge: HOME | End: 2022-03-10
Payer: COMMERCIAL

## 2022-03-10 NOTE — PROGRESS NOTES
Physical Therapy  Outpatient Physical Therapy    [x] [place]  Phone: [phone]  Fax: [phone]      [] [place]  Phone: [phone]  Fax: [phone]    Physical Therapy  Cancellation/No-show Note  Patient Name:  Cynthia Amador  :  1990   Date:  3/10/2022  Cancelled visits to date: 4  No-shows to date: 0    For today's appointment patient:  [x]  Cancelled  []  Rescheduled appointment  []  No-show     Reason given by patient:  [x]  Patient ill  []  Conflicting appointment  []  No transportation    []  Conflict with work  []  No reason given   []  Other:     Comments:      Electronically signed by: Ayana Stevenson PTA

## 2022-03-15 ENCOUNTER — APPOINTMENT (OUTPATIENT)
Dept: PHYSICAL THERAPY | Age: 32
End: 2022-03-15
Payer: COMMERCIAL

## 2022-03-16 ENCOUNTER — HOSPITAL ENCOUNTER (OUTPATIENT)
Dept: PHYSICAL THERAPY | Age: 32
Setting detail: THERAPIES SERIES
Discharge: HOME | End: 2022-03-16
Payer: COMMERCIAL

## 2022-03-16 PROCEDURE — 97110 THERAPEUTIC EXERCISES: CPT | Performed by: PHYSICAL THERAPIST

## 2022-03-16 NOTE — FLOWSHEET NOTE
Physical Therapy Daily Treatment Note    Date:  3/16/2022    Patient Name:  Sharri Cantu    :  1990  MRN: [id number]  Restrictions/Precautions:     Medical/Treatment Diagnosis Information:   · Diagnosis: S83.512 L ACL rupture  · Treatment Diagnosis: S83.512 L ACL rupture  Insurance/Certification information:  PT Insurance Information: [facility] ( 12 visits)  Physician Information:  Referring Practitioner: Julieta Guardado of care signed (Y/N): y   Visit# / total visits:  of 2nd POC , 18 total   Pain level: 5/10    Time In: 11:00  Time Out: 11:54    Progress Note: []  Yes  [x]  No  Next due by: Visit #12,  (C-9 end date 22)     Subjective: L knee eduardo and gives out numerous times per day    Objective:   RTD: 22   Observation:   Test measurements:  Has the sense of knee instability with lunges in all direction  Instability with multi-direction step up         Exercises:   Exercise/Equipment Resistance/Repetitions Other comments   Bike 6' Seat 6   L TKE blue 20x    HS curl -sit blue 20x    Squat matrix 10x, 9 positions    Step up 8\" 15x ea Fwd/lat/retro   4-way hip 15x ea green    Resist Lateral walk 3 laps at bars [facility] walk 3 laps at bars, grn    TM retro 4' Incline 3.5, 1.5 mph   single leg heel taps 10x 2\"     1/2 kneeling  foam x10    PKF                   Crate lift 10x     20#         IFC     [x] Provided verbal/tactile cueing for activities related to strengthening, flexibility, endurance, ROM. (85393)  [] Provided verbal/tactile cueing for activities related to improving balance, coordination, kinesthetic sense, posture, motor skill, proprioception. (95357)    Therapeutic Activities:     [] Therapeutic activities, direct (one-on-one) patient contact (use of dynamic activities to improve functional performance). (77466)    Gait:   [] Provided training and instruction to the patient for ambulation re-education.  (98312)    Self-Care/ADL's  [] Self-care/home management training and compensatory training, meal preparation, safety procedures, and instructions in use of assistive technology devices/adaptive equipment, direct one-on-one contact. (32843)    Home Exercise Program:  T-band TKE & HS curl   [x] Reviewed/Progressed HEP activities related to strengthening, flexibility, endurance, ROM. (37889)  [] Reviewed/Progressed HEP activities related to improving balance, coordination, kinesthetic sense, posture, motor skill, proprioception.  (88473)    Manual Treatments:    [] Provided manual therapy to mobilize soft tissue/joints for the purpose of modulating pain, promoting relaxation,  increasing ROM, reducing/eliminating soft tissue swelling/inflammation/restriction, improving soft tissue extensibility. (83607)    Service Based Modalities:      Timed Code Treatment Minutes:    39' there ex      Total Treatment Minutes:   39'     Treatment/Activity Tolerance:  [x] Patient tolerated treatment well [] Patient limited by fatique  [] Patient limited by pain  [] Patient limited by other medical complications  [] Other:     Prognosis: [x] Good [] Fair  [] Poor    Patient Requires Follow-up: [x] Yes  [] No      Goals:  Short term goals  Time Frame for Short term goals: 1 week  Short term goal 1: Start HEP- met   Long term goals  Time Frame for Long term goals : 6 weeks  Long term goal 1: L knee pain controlled at 2-3/10 to allow more normal gait    Long term goal 2: 5-/5 strength for joint stability   Long term goal 3: Step up/ down 8\" steps in reciprocal fashion (step ups with 8 inch step)  Long term goal 4: Tolerate standing, without devices 3-4 hr  (unable)  Long term goal 5: RTW, starting at modified duty  (working in sit down position)        Plan:   [x] Continue per plan of care [] Alter current plan (see comments)  [] Plan of care initiated [] Hold pending MD visit [] Discharge    Plan for Next Session:  Progress per pt tolerance.     Electronically signed by:  Tracy Dick PT

## 2022-03-18 ENCOUNTER — HOSPITAL ENCOUNTER (OUTPATIENT)
Dept: PHYSICAL THERAPY | Age: 32
Setting detail: THERAPIES SERIES
Discharge: HOME | End: 2022-03-18
Payer: COMMERCIAL

## 2022-03-18 PROCEDURE — 97110 THERAPEUTIC EXERCISES: CPT

## 2022-03-18 NOTE — FLOWSHEET NOTE
Physical Therapy Daily Treatment Note    Date:  3/18/2022    Patient Name:  Donna Davey    :  1990  MRN: [id number]  Restrictions/Precautions:     Medical/Treatment Diagnosis Information:   · Diagnosis: S83.512 L ACL rupture  · Treatment Diagnosis: S83.512 L ACL rupture  Insurance/Certification information:  PT Insurance Information: [facility] ( 12 visits)  Physician Information:  Referring Practitioner: Lashanda Acharya of care signed (Y/N): y   Visit# / total visits:  of  POC , 19 total   Pain level: 5/10     Time In: 11:45  Time Out: 12:23    Progress Note: []  Yes  [x]  No  Next due by: Visit #12,  (C-9 end date 22)     Subjective: Patient states things are the same. The knee might be getting a little stronger but still painful. Knee continues to give out. Objective: Current program continues to challenge patient. Verbal instructions provided throughout session. Largest challenge continues to be crate lift. RTD: 22     Observation:    Test measurements:            Exercises:   Exercise/Equipment Resistance/Repetitions Other comments   Bike 6' Seat 6   L TKE blue 20x    HS curl -sit blue 20x    Squat matrix 10x, 9 positions    Step up 8\" 15x ea Fwd/lat/retro   4-way hip 15x ea green    Resist Lateral walk 3 laps at bars [facility] walk 3 laps at bars, grn    TM retro 4' Incline 4.0, 1.5 mph   single leg heel taps 10x 2\"     1/2 kneeling  foam x10    PKF                   Crate lift 10x     20#              [x] Provided verbal/tactile cueing for activities related to strengthening, flexibility, endurance, ROM. (03257)  [] Provided verbal/tactile cueing for activities related to improving balance, coordination, kinesthetic sense, posture, motor skill, proprioception. (22182)    Therapeutic Activities:     [] Therapeutic activities, direct (one-on-one) patient contact (use of dynamic activities to improve functional performance).  (12630)    Gait:   [] Provided training and instruction to the patient for ambulation re-education. (39997)    Self-Care/ADL's  [] Self-care/home management training and compensatory training, meal preparation, safety procedures, and instructions in use of assistive technology devices/adaptive equipment, direct one-on-one contact. (55441)    Home Exercise Program:  T-band TKE & HS curl   [x] Reviewed/Progressed HEP activities related to strengthening, flexibility, endurance, ROM. (38831)  [] Reviewed/Progressed HEP activities related to improving balance, coordination, kinesthetic sense, posture, motor skill, proprioception.  (08633)    Manual Treatments:    [] Provided manual therapy to mobilize soft tissue/joints for the purpose of modulating pain, promoting relaxation,  increasing ROM, reducing/eliminating soft tissue swelling/inflammation/restriction, improving soft tissue extensibility.  (88754)    Service Based Modalities:      Timed Code Treatment Minutes:    45' there ex      Total Treatment Minutes:   45'     Treatment/Activity Tolerance:  [x] Patient tolerated treatment well [] Patient limited by fatique  [] Patient limited by pain  [] Patient limited by other medical complications  [] Other:     Prognosis: [x] Good [] Fair  [] Poor    Patient Requires Follow-up: [x] Yes  [] No      Goals:  Short term goals  Time Frame for Short term goals: 1 week  Short term goal 1: Start HEP- met   Long term goals  Time Frame for Long term goals : 6 weeks  Long term goal 1: L knee pain controlled at 2-3/10 to allow more normal gait    Long term goal 2: 5-/5 strength for joint stability   Long term goal 3: Step up/ down 8\" steps in reciprocal fashion (step ups with 8 inch step)  Long term goal 4: Tolerate standing, without devices 3-4 hr  (unable)  Long term goal 5: RTW, starting at modified duty  (working in sit down position)        Plan:   [x] Continue per plan of care [] Alter current plan (see comments)  [] Plan of care initiated [] Hold pending MD visit [] Discharge    Plan for Next Session:  Progress per pt tolerance.     Electronically signed by:  Dianne German PTA

## 2022-03-22 ENCOUNTER — HOSPITAL ENCOUNTER (OUTPATIENT)
Dept: PHYSICAL THERAPY | Age: 32
Setting detail: THERAPIES SERIES
Discharge: HOME | End: 2022-03-22
Payer: COMMERCIAL

## 2022-03-22 PROCEDURE — 97110 THERAPEUTIC EXERCISES: CPT

## 2022-03-22 NOTE — FLOWSHEET NOTE
Physical Therapy Daily Treatment Note    Date:  3/22/2022    Patient Name:  Adri Nash    :  1990  MRN: [id number]  Restrictions/Precautions:     Medical/Treatment Diagnosis Information:   · Diagnosis: S83.512 L ACL rupture  · Treatment Diagnosis: S83.512 L ACL rupture  Insurance/Certification information:  PT Insurance Information: [facility] ( 12 visits)  Physician Information:  Referring Practitioner: Sheryl Lanes of care signed (Y/N): y   Visit# / total visits:  of  POC , 20 total   Pain level: 5/10     Time In: 11:45  Time Out: 12:28     Progress Note: []  Yes  [x]  No Next due by: Visit #12,  (C-9 end date 22)     Subjective: Patient states things are the same. Patient states he jammed his leg this date. Rating pain 7/10 when he did it, now rating 5/10. Objective: Current program continues to challenge patient. Instability noted with some exercises. Crate lift is hardest exercise to perform currently. Added leg press this date with good challenge.       RTD: 22     Observation:    Test measurements:            Exercises:   Exercise/Equipment Resistance/Repetitions Other comments   Bike 6' Seat 6   L TKE blue 20x    HS curl -sit blue 20x    Squat matrix 10x, 9 positions    Step up 8\" 15x ea Fwd/lat/retro   4-way hip 15x ea green    Resist Lateral walk 3 laps at bars [facility] walk 3 laps at bars, grn    TM retro 4' Incline 4.0, 1.5 mph   single leg heel taps 10x 2\"     1/2 kneeling  foam x10    PKF              Leg press 10x2   50#    Crate lift 10x     20#              [x] Provided verbal/tactile cueing for activities related to strengthening, flexibility, endurance, ROM. (27597)  [] Provided verbal/tactile cueing for activities related to improving balance, coordination, kinesthetic sense, posture, motor skill, proprioception. (96678)    Therapeutic Activities:     [] Therapeutic activities, direct (one-on-one) patient contact (use of dynamic activities to improve functional performance). (80750)    Gait:   [] Provided training and instruction to the patient for ambulation re-education. (39641)    Self-Care/ADL's  [] Self-care/home management training and compensatory training, meal preparation, safety procedures, and instructions in use of assistive technology devices/adaptive equipment, direct one-on-one contact. (94982)    Home Exercise Program:  T-band TKE & HS curl   [x] Reviewed/Progressed HEP activities related to strengthening, flexibility, endurance, ROM. (18051)  [] Reviewed/Progressed HEP activities related to improving balance, coordination, kinesthetic sense, posture, motor skill, proprioception.  (52474)    Manual Treatments:    [] Provided manual therapy to mobilize soft tissue/joints for the purpose of modulating pain, promoting relaxation,  increasing ROM, reducing/eliminating soft tissue swelling/inflammation/restriction, improving soft tissue extensibility.  (48734)    Service Based Modalities:      Timed Code Treatment Minutes:    37' there ex      Total Treatment Minutes:   37'     Treatment/Activity Tolerance:  [x] Patient tolerated treatment well [] Patient limited by fatique  [] Patient limited by pain  [] Patient limited by other medical complications  [] Other:     Prognosis: [x] Good [] Fair  [] Poor    Patient Requires Follow-up: [x] Yes  [] No      Goals:  Short term goals  Time Frame for Short term goals: 1 week  Short term goal 1: Start HEP- met   Long term goals  Time Frame for Long term goals : 6 weeks  Long term goal 1: L knee pain controlled at 2-3/10 to allow more normal gait    Long term goal 2: 5-/5 strength for joint stability   Long term goal 3: Step up/ down 8\" steps in reciprocal fashion (step ups with 8 inch step)  Long term goal 4: Tolerate standing, without devices 3-4 hr  (unable)  Long term goal 5: RTW, starting at modified duty  (working in sit down position)        Plan:   [x] Continue per plan of care [] Alter current plan (see comments)  [] Plan of care initiated [] Hold pending MD visit [] Discharge    Plan for Next Session:  Progress per pt tolerance.     Electronically signed by:  Marissa Griffith PTA

## 2022-03-22 NOTE — PROGRESS NOTES
I have reviewed and agree to the content of the note written by the PTA.   Electronically signed by Ana Barillas PT [id number]

## 2022-03-25 ENCOUNTER — HOSPITAL ENCOUNTER (OUTPATIENT)
Dept: PHYSICAL THERAPY | Age: 32
Setting detail: THERAPIES SERIES
Discharge: HOME | End: 2022-03-25
Payer: COMMERCIAL

## 2022-03-25 PROCEDURE — 97110 THERAPEUTIC EXERCISES: CPT

## 2022-03-25 NOTE — PROGRESS NOTES
I have reviewed and agree to the content of the note written by the PTA.   Electronically signed by Naomi Calderon PT [id number]

## 2022-03-25 NOTE — FLOWSHEET NOTE
Physical Therapy Daily Treatment Note    Date:  3/25/2022    Patient Name:  Delmis Dawson    :  1990  MRN: [id number]  Restrictions/Precautions:     Medical/Treatment Diagnosis Information:   · Diagnosis: S83.512 L ACL rupture  · Treatment Diagnosis: S83.512 L ACL rupture  Insurance/Certification information:  PT Insurance Information: [facility] ( 12 visits)  Physician Information:  Referring Practitioner: Milton Lazaro of care signed (Y/N): y   Visit# / total visits:  of  POC , 21 total   Pain level: 5/10     Time In: 11:45  Time Out: 12:27    Progress Note: []  Yes  [x]  No Next due by: Visit #12,  (C-9 end date 22)     Subjective: Patient states things have not changed. Objective: Current program continues to challenge patient. Instability noted with some exercises. Added leg press with good tolerance. RTD: 22     Observation:    Test measurements:            Exercises:   Exercise/Equipment Resistance/Repetitions Other comments   Bike 6' Seat 6   L TKE blue 20x    HS curl -sit blue 20x    Squat matrix 10x, 9 positions    Step up 8\" 15x ea Fwd/lat/retro   4-way hip 15x ea green    Resist Lateral walk 3 laps at bars [facility] walk 3 laps at bars, grn    TM retro 4' Incline 4.0, 1.5 mph   single leg heel taps 10x 2\"     1/2 kneeling  foam x10    PKF              Leg press 10x2   50#    Crate lift 10x     20#              [x] Provided verbal/tactile cueing for activities related to strengthening, flexibility, endurance, ROM. (33157)  [] Provided verbal/tactile cueing for activities related to improving balance, coordination, kinesthetic sense, posture, motor skill, proprioception. (06928)    Therapeutic Activities:     [] Therapeutic activities, direct (one-on-one) patient contact (use of dynamic activities to improve functional performance). (04296)    Gait:   [] Provided training and instruction to the patient for ambulation re-education.  (91201)    Self-Care/ADL's  [] Self-care/home management training and compensatory training, meal preparation, safety procedures, and instructions in use of assistive technology devices/adaptive equipment, direct one-on-one contact. (15482)    Home Exercise Program:  T-band TKE & HS curl   [x] Reviewed/Progressed HEP activities related to strengthening, flexibility, endurance, ROM. (25475)  [] Reviewed/Progressed HEP activities related to improving balance, coordination, kinesthetic sense, posture, motor skill, proprioception.  (71379)    Manual Treatments:    [] Provided manual therapy to mobilize soft tissue/joints for the purpose of modulating pain, promoting relaxation,  increasing ROM, reducing/eliminating soft tissue swelling/inflammation/restriction, improving soft tissue extensibility. (37879)    Service Based Modalities:      Timed Code Treatment Minutes:    43' there ex      Total Treatment Minutes:   43'     Treatment/Activity Tolerance:  [x] Patient tolerated treatment well [] Patient limited by fatique  [] Patient limited by pain  [] Patient limited by other medical complications  [] Other:     Prognosis: [x] Good [] Fair  [] Poor    Patient Requires Follow-up: [x] Yes  [] No      Goals:  Short term goals  Time Frame for Short term goals: 1 week  Short term goal 1: Start HEP- met   Long term goals  Time Frame for Long term goals : 6 weeks  Long term goal 1: L knee pain controlled at 2-3/10 to allow more normal gait    Long term goal 2: 5-/5 strength for joint stability   Long term goal 3: Step up/ down 8\" steps in reciprocal fashion (step ups with 8 inch step)  Long term goal 4: Tolerate standing, without devices 3-4 hr  (unable)  Long term goal 5: RTW, starting at modified duty  (working in sit down position)        Plan:   [x] Continue per plan of care [] Alter current plan (see comments)  [] Plan of care initiated [] Hold pending MD visit [] Discharge    Plan for Next Session:  Progress per pt tolerance.     Electronically signed by:  Fred Nogueira, PTA

## 2022-03-28 ENCOUNTER — HOSPITAL ENCOUNTER (OUTPATIENT)
Dept: PHYSICAL THERAPY | Age: 32
Setting detail: THERAPIES SERIES
Discharge: HOME | End: 2022-03-28
Payer: COMMERCIAL

## 2022-03-28 PROCEDURE — 97110 THERAPEUTIC EXERCISES: CPT

## 2022-03-28 NOTE — FLOWSHEET NOTE
Physical Therapy Daily Treatment Note    Date:  3/28/2022    Patient Name:  Trisha Ramirez    :  1990  MRN: [id number]  Restrictions/Precautions:     Medical/Treatment Diagnosis Information:   · Diagnosis: S83.512 L ACL rupture  · Treatment Diagnosis: S83.512 L ACL rupture  Insurance/Certification information:  PT Insurance Information: [facility] ( 12 visits)  Physician Information:  Referring Practitioner: John Stroud of care signed (Y/N): y   Visit# / total visits: 10/12 of  total   Pain level: 510     Time In: 11:43  Time Out: 12:29    Progress Note: []  Yes  [x]  No Next due by: Visit #12,  (C-9 end date 22)     Subjective: Patient states everything is continuing to stay the same. Objective: Current program continues to challenge patient. Noted increased LE fatigue with exercises this date. Verbal instructions and encouragement provided. No rest breaks required. RTD: 22     Observation:    Test measurements:            Exercises:   Exercise/Equipment Resistance/Repetitions Other comments   Bike 6' Seat 6   L TKE blue 20x    HS curl -sit blue 20x    Squat matrix 10x, 9 positions    Step up 8\" 15x ea Fwd/lat/retro   4-way hip 15x ea blue    Resist Lateral walk 3 laps at bars [facility] walk 3 laps at bars, grn    TM retro 4' Incline 4.0, 1.5 mph   single leg heel taps 10x 2\"     1/2 kneeling  foam x10    PKF              Leg press 10x2   50#    Crate lift 10x2     20#              [x] Provided verbal/tactile cueing for activities related to strengthening, flexibility, endurance, ROM. (87307)  [] Provided verbal/tactile cueing for activities related to improving balance, coordination, kinesthetic sense, posture, motor skill, proprioception. (53617)    Therapeutic Activities:     [] Therapeutic activities, direct (one-on-one) patient contact (use of dynamic activities to improve functional performance).  (01703)    Gait:   [] Provided training and instruction to the patient for ambulation re-education. (53616)    Self-Care/ADL's  [] Self-care/home management training and compensatory training, meal preparation, safety procedures, and instructions in use of assistive technology devices/adaptive equipment, direct one-on-one contact. (30808)    Home Exercise Program:  T-band TKE & HS curl   [x] Reviewed/Progressed HEP activities related to strengthening, flexibility, endurance, ROM. (86620)  [] Reviewed/Progressed HEP activities related to improving balance, coordination, kinesthetic sense, posture, motor skill, proprioception.  (95287)    Manual Treatments:    [] Provided manual therapy to mobilize soft tissue/joints for the purpose of modulating pain, promoting relaxation,  increasing ROM, reducing/eliminating soft tissue swelling/inflammation/restriction, improving soft tissue extensibility.  (97547)    Service Based Modalities:      Timed Code Treatment Minutes:    55' there ex      Total Treatment Minutes:   55'     Treatment/Activity Tolerance:  [x] Patient tolerated treatment well [] Patient limited by fatique  [] Patient limited by pain  [] Patient limited by other medical complications  [] Other:     Prognosis: [x] Good [] Fair  [] Poor    Patient Requires Follow-up: [x] Yes  [] No      Goals:  Short term goals  Time Frame for Short term goals: 1 week  Short term goal 1: Start HEP- met   Long term goals  Time Frame for Long term goals : 6 weeks  Long term goal 1: L knee pain controlled at 2-3/10 to allow more normal gait    Long term goal 2: 5-/5 strength for joint stability   Long term goal 3: Step up/ down 8\" steps in reciprocal fashion (step ups with 8 inch step)  Long term goal 4: Tolerate standing, without devices 3-4 hr  (unable)  Long term goal 5: RTW, starting at modified duty  (working in sit down position)        Plan:   [x] Continue per plan of care [] Alter current plan (see comments)  [] Plan of care initiated [] Hold pending MD visit [] Discharge    Plan for Next Session:  Progress per pt tolerance.     Electronically signed by:  Dimitri Zarco PTA

## 2022-03-31 ENCOUNTER — HOSPITAL ENCOUNTER (OUTPATIENT)
Dept: PHYSICAL THERAPY | Age: 32
Setting detail: THERAPIES SERIES
Discharge: HOME | End: 2022-03-31
Payer: COMMERCIAL

## 2022-04-05 ENCOUNTER — HOSPITAL ENCOUNTER (OUTPATIENT)
Dept: PHYSICAL THERAPY | Age: 32
Setting detail: THERAPIES SERIES
Discharge: HOME | End: 2022-04-05
Payer: COMMERCIAL

## 2022-04-05 PROCEDURE — 97110 THERAPEUTIC EXERCISES: CPT

## 2022-04-05 NOTE — FLOWSHEET NOTE
Physical Therapy Daily Treatment Note    Date:  2022    Patient Name:  Mary Beth Sin    :  1990  MRN: [id number]  Restrictions/Precautions:     Medical/Treatment Diagnosis Information:   · Diagnosis: S83.512 L ACL rupture  · Treatment Diagnosis: S83.512 L ACL rupture  Insurance/Certification information:  PT Insurance Information: [facility] ( 12 visits)  Physician Information:  Referring Practitioner: Feliz Hyde of care signed (Y/N): y   Visit# / total visits:  of  POC , 23 total   Pain level: 5/10     Time In: 1:16  Time Out: 2:04    Progress Note: []  Yes  [x]  No Next due by: Visit #12,  (C-9 end date 22)     Subjective: Patient states everything is continuing to stay the same. Pt notes decreased pain when still, but experiences 7-8/10 pain with prolonged movement. Pt reports pain will occasionally keep him up at night. Pt notes tolerating only 5mins static standing, and 5-10mins dynamic standing/walking, before requiring seated rest break. Pt reports feeling pain in medial anterior/posterior knee, noting it feeling \"like a soggy sock sliding down the back of my leg. \" Pt also notes feeling like the femur is sliding backwards on the tibia with stops and direction changes during ambulation. Objective: Current program continues to challenge patient. Noted increased LE fatigue with exercises this date. Verbal instructions and encouragement provided.      RTD: 22     Observation:    Test measurements:      L hip Flex 4/5  L hip abd/add 4+/5  L Knee Ext 4/5  L knee Flex 4- to 4/5         Exercises:   Exercise/Equipment Resistance/Repetitions Other comments   Bike 6' Seat 6   L TKE blue 20x    HS curl -sit blue 20x    Squat matrix 10x, 9 positions    Step up 8\" 15x ea Fwd/lat/retro   4-way hip 15x ea blue    Resist Lateral walk 3 laps at bars [facility] walk 3 laps at bars, grn    TM retro  Incline 4.0, 1.5 mph   single leg heel taps 10x 2\"     1/2 kneeling  foam x10    PKF Leg press 10x2   50#    Crate lift 10x2     20#              [x] Provided verbal/tactile cueing for activities related to strengthening, flexibility, endurance, ROM. (24225)  [] Provided verbal/tactile cueing for activities related to improving balance, coordination, kinesthetic sense, posture, motor skill, proprioception. (82598)    Therapeutic Activities:     [] Therapeutic activities, direct (one-on-one) patient contact (use of dynamic activities to improve functional performance). (60756)    Gait:   [] Provided training and instruction to the patient for ambulation re-education. (46855)    Self-Care/ADL's  [] Self-care/home management training and compensatory training, meal preparation, safety procedures, and instructions in use of assistive technology devices/adaptive equipment, direct one-on-one contact. (86803)    Home Exercise Program:  T-band TKE & HS curl   [x] Reviewed/Progressed HEP activities related to strengthening, flexibility, endurance, ROM. (00997)  [] Reviewed/Progressed HEP activities related to improving balance, coordination, kinesthetic sense, posture, motor skill, proprioception.  (60656)    Manual Treatments:    [] Provided manual therapy to mobilize soft tissue/joints for the purpose of modulating pain, promoting relaxation,  increasing ROM, reducing/eliminating soft tissue swelling/inflammation/restriction, improving soft tissue extensibility.  (23884)    Service Based Modalities:      Timed Code Treatment Minutes:    50' there ex      Total Treatment Minutes:   50'     Treatment/Activity Tolerance:  [x] Patient tolerated treatment well [] Patient limited by fatique  [] Patient limited by pain  [] Patient limited by other medical complications  [] Other:     Prognosis: [x] Good [] Fair  [] Poor    Patient Requires Follow-up: [x] Yes  [] No      Goals:  Short term goals  Time Frame for Short term goals: 1 week  Short term goal 1: Start HEP- met   Long term goals  Time Frame for Long term goals : 6 weeks  Long term goal 1: L knee pain controlled at 2-3/10 to allow more normal gait (see above)    Long term goal 2: 5-/5 strength for joint stability (see above)  Long term goal 3: Step up/ down 8\" steps in reciprocal fashion (step ups with 8 inch step)  Long term goal 4: Tolerate standing, without devices 3-4 hr  (unable)  Long term goal 5: RTW, starting at modified duty  (working in sit down position)        Plan:   [] Continue per plan of care [] Alter current plan (see comments)  [] Plan of care initiated [] Hold pending MD visit [x] Discharge    Plan for Next Session:      Electronically signed by:  Benito Medellin PTA

## 2022-04-05 NOTE — PROGRESS NOTES
I have reviewed and agree to the content of the note written by the PTA.   Electronically signed by Ne Klein PT [id number]

## 2022-04-07 ENCOUNTER — APPOINTMENT (OUTPATIENT)
Dept: PHYSICAL THERAPY | Age: 32
End: 2022-04-07
Payer: COMMERCIAL

## 2022-04-08 NOTE — DISCHARGE SUMMARY
[facility]    [x] [place]  Phone: [phone]  Fax: [phone]      [] [place]  Phone: [phone]  Fax: [phone]    Physical Therapy Discharge Note  Date: 2022        Patient Name:  Tiffanie Carmen    :  1990  MRN: [id number]  Restrictions/Precautions:      Medical/Treatment Diagnosis Information:  ·   Diagnosis: S83.512 L ACL rupture  · Treatment Diagnosis: S83.512 L ACL rupture  ·   ·    Insurance/Certification information:    [facility]  Physician Information:    Iwona Meyer  Plan of care signed (Y/N):y  Visit# / total visits:  23  Pain level: 5/10       Plan of Care/Treatment to date:  [x] Therapeutic Exercise    [] Modalities:  [x] Therapeutic Activity     [] Ultrasound  [] Electrical Stimulation  [] Gait Training      [] Cervical Traction    [] Lumbar Traction  [x] Neuromuscular Re-education  [] Cold/hotpack [] Iontophoresis  [x] Instruction in HEP      Other:  [x] Manual Therapy       []    [] Aquatic Therapy       []                              Subjective:    L knee eduardo and gives out numerous times per day     Pt notes tolerating only 5mins static standing, and 5-10mins dynamic standing/walking, before requiring seated rest break     Objective:    Test measurements:  Has the sense of knee instability with lunges in all direction  Instability with multi-direction step up    L Knee Ext 4/5  L knee Flex 4- to 4/5         Assessment:   Knee instability    Plan:    d/c   Return to ortho 22    Goals:    Short term goals  Time Frame for Short term goals: 1 week  Short term goal 1: Start HEP- met   Long term goals  Time Frame for Long term goals : 6 weeks  Long term goal 1: L knee pain controlled at 2-3/10 to allow more normal gait -not met    Long term goal 2: 5-/5 strength for joint stability -part met  Long term goal 3: Step up/ down 8\" steps in reciprocal fashion - instability when descending  Long term goal 4: Tolerate standing, without devices 3-4 hr  - not met  Long term goal 5: RTW, starting at modified duty -part met            Percentage of Goals Met: 40            Discharge Prognosis: [] Excellent [] Good [x] Fair  [] Poor     Goal Status:  [] Achieved [] Partially Achieved  [x] Not Achieved       Electronically signed by:  Peg Quiñonez PT

## 2022-04-11 ENCOUNTER — OFFICE VISIT (OUTPATIENT)
Dept: ORTHOPEDIC SURGERY | Age: 32
End: 2022-04-11
Payer: COMMERCIAL

## 2022-04-11 VITALS
DIASTOLIC BLOOD PRESSURE: 78 MMHG | HEIGHT: 66 IN | SYSTOLIC BLOOD PRESSURE: 128 MMHG | BODY MASS INDEX: 23.46 KG/M2 | HEART RATE: 88 BPM | WEIGHT: 146 LBS

## 2022-04-11 DIAGNOSIS — S83.512D RUPTURE OF ANTERIOR CRUCIATE LIGAMENT OF LEFT KNEE, SUBSEQUENT ENCOUNTER: Primary | ICD-10-CM

## 2022-04-11 PROCEDURE — 99211 OFF/OP EST MAY X REQ PHY/QHP: CPT | Performed by: ORTHOPAEDIC SURGERY

## 2022-04-11 PROCEDURE — 99213 OFFICE O/P EST LOW 20 MIN: CPT | Performed by: ORTHOPAEDIC SURGERY

## 2022-04-11 NOTE — PROGRESS NOTES
Orthopedic Office Note  [address], [address]  [facility] [address]      CHIEF COMPLAINT:    Chief Complaint   Patient presents with    Knee Pain     rech left knee       HISTORY OF PRESENT ILLNESS:      The patient is a 33 y.o. male  who presents today for follow-up of his left knee injury. He feels as though physical therapy has not alleviated the symptoms. He continues to have left knee pain with activities of daily living. Past Medical History:    Past Medical History:   Diagnosis Date    Deep vein thrombosis (DVT) (HCC)     right upper extremity    Ear infection     history of multiple as a child    IV drug abuse ([facility] [address]     Oxycontin    Marijuana abuse     Tobacco abuse        Past Surgical History:    Past Surgical History:   Procedure Laterality Date    COLONOSCOPY  2015    Protitis    HAND SURGERY Right        Medications Prior to Admission:   No current outpatient medications on file. No current facility-administered medications for this visit. Allergies:  Patient has no known allergies.     Social History:   Social History     Tobacco Use   Smoking Status Former Smoker    Packs/day: 0.50    Years: 3.00    Pack years: 1.50    Types: Cigarettes    Quit date: 11/15/2016    Years since quittin.4   Smokeless Tobacco Former User    Types: Chew     Social History     Substance and Sexual Activity   Alcohol Use Yes    Alcohol/week: 0.0 standard drinks    Comment: occassional- once every couple months 2 drinks mainly     Social History     Substance and Sexual Activity   Drug Use No    Comment: history of IV OxyContin abuse and marijuana abuse        Family History:  Family History   Problem Relation Age of Onset    Heart Disease Other     Hypertension Other     Asthma Other     Diabetes Paternal Grandmother          REVIEW OF SYSTEMS:  Please see the ROS form attached to today's encounter. I have reviewed it with the patient during the visit. All other systems were reviewed and are negative. PHYSICAL EXAM:  Left knee has no joint effusion. There is no erythema or warmth. He has medial lateral joint line tenderness. No gross ligamentous instability. Radiology:      ASSESSMENT/PLAN:  1. Rupture of anterior cruciate ligament of left knee, subsequent encounter        I discussed with the patient again today that I am surprised that he continues to have the significant left knee pain. I recommended a repeat MRI scan of his left knee given no improvement in symptoms. He will follow up once the MRI is complete. No orders of the defined types were placed in this encounter.        Debora Carrington MD

## 2022-04-18 DIAGNOSIS — S83.512D RUPTURE OF ANTERIOR CRUCIATE LIGAMENT OF LEFT KNEE, SUBSEQUENT ENCOUNTER: Primary | ICD-10-CM

## 2022-04-27 ENCOUNTER — HOSPITAL ENCOUNTER (OUTPATIENT)
Dept: MRI IMAGING | Age: 32
Discharge: HOME | End: 2022-04-29
Payer: COMMERCIAL

## 2022-04-27 DIAGNOSIS — S83.512D RUPTURE OF ANTERIOR CRUCIATE LIGAMENT OF LEFT KNEE, SUBSEQUENT ENCOUNTER: ICD-10-CM

## 2022-04-27 PROCEDURE — 73721 MRI JNT OF LWR EXTRE W/O DYE: CPT

## 2022-05-02 ENCOUNTER — OFFICE VISIT (OUTPATIENT)
Dept: ORTHOPEDIC SURGERY | Age: 32
End: 2022-05-02
Payer: COMMERCIAL

## 2022-05-02 VITALS
HEIGHT: 66 IN | SYSTOLIC BLOOD PRESSURE: 131 MMHG | DIASTOLIC BLOOD PRESSURE: 80 MMHG | WEIGHT: 146 LBS | HEART RATE: 69 BPM | BODY MASS INDEX: 23.46 KG/M2

## 2022-05-02 DIAGNOSIS — S83.512D RUPTURE OF ANTERIOR CRUCIATE LIGAMENT OF LEFT KNEE, SUBSEQUENT ENCOUNTER: Primary | ICD-10-CM

## 2022-05-02 PROCEDURE — 99211 OFF/OP EST MAY X REQ PHY/QHP: CPT | Performed by: ORTHOPAEDIC SURGERY

## 2022-05-02 PROCEDURE — 99213 OFFICE O/P EST LOW 20 MIN: CPT | Performed by: ORTHOPAEDIC SURGERY

## 2022-05-02 NOTE — PROGRESS NOTES
Orthopedic Office Note  [address], [address]  [facility] [address]      CHIEF COMPLAINT:    Chief Complaint   Patient presents with    Knee Pain     BWC/ MRI RESULTS LEFT KNEE       HISTORY OF PRESENT ILLNESS:      The patient is a 27 y.o. male  who presents today for follow-up of his left knee work-related injury. He continues to have left knee pain and giving way with activities of daily living. Past Medical History:    Past Medical History:   Diagnosis Date    Deep vein thrombosis (DVT) (HCC)     right upper extremity    Ear infection     history of multiple as a child    IV drug abuse ([facility] [address]     Oxycontin    Marijuana abuse     Tobacco abuse        Past Surgical History:    Past Surgical History:   Procedure Laterality Date    COLONOSCOPY  2015    Protitis    HAND SURGERY Right        Medications Prior to Admission:   No current outpatient medications on file. No current facility-administered medications for this visit. Allergies:  Patient has no known allergies.     Social History:   Social History     Tobacco Use   Smoking Status Former Smoker    Packs/day: 0.50    Years: 3.00    Pack years: 1.50    Types: Cigarettes    Quit date: 11/15/2016    Years since quittin.4   Smokeless Tobacco Former User    Types: Chew     Social History     Substance and Sexual Activity   Alcohol Use Yes    Alcohol/week: 0.0 standard drinks    Comment: occassional- once every couple months 2 drinks mainly     Social History     Substance and Sexual Activity   Drug Use No    Comment: history of IV OxyContin abuse and marijuana abuse        Family History:  Family History   Problem Relation Age of Onset    Heart Disease Other     Hypertension Other     Asthma Other     Diabetes Paternal Grandmother          REVIEW OF SYSTEMS:  Please see the ROS form attached to today's encounter. I have reviewed it with the patient during the visit. All other systems were reviewed and are negative. PHYSICAL EXAM:  Left knee has no joint effusion. He has medial joint line tenderness. He has a negative Lachman's test.  No varus or valgus instability. Gait is nonantalgic. Radiology:  I reviewed his MRI report and images and agree with the findings of some signal change within the ACL which is consistent with a partial ACL tear. No meniscal tear is identified    ASSESSMENT/PLAN:  1. Rupture of anterior cruciate ligament of left knee, subsequent encounter        Given this persistent pain after his partial ACL tear I have recommended a corticosteroid injection. He will follow-up pending approval for this injection. We will keep him on seated duty work in the interim. No orders of the defined types were placed in this encounter.        Moreno Arita MD

## 2022-05-16 ENCOUNTER — OFFICE VISIT (OUTPATIENT)
Dept: ORTHOPEDIC SURGERY | Age: 32
End: 2022-05-16
Payer: COMMERCIAL

## 2022-05-16 VITALS
HEIGHT: 66 IN | DIASTOLIC BLOOD PRESSURE: 84 MMHG | BODY MASS INDEX: 23.46 KG/M2 | WEIGHT: 146 LBS | SYSTOLIC BLOOD PRESSURE: 124 MMHG | HEART RATE: 102 BPM

## 2022-05-16 DIAGNOSIS — S83.512D RUPTURE OF ANTERIOR CRUCIATE LIGAMENT OF LEFT KNEE, SUBSEQUENT ENCOUNTER: Primary | ICD-10-CM

## 2022-05-16 PROCEDURE — 20610 DRAIN/INJ JOINT/BURSA W/O US: CPT | Performed by: ORTHOPAEDIC SURGERY

## 2022-05-16 RX ORDER — BUPIVACAINE HYDROCHLORIDE 5 MG/ML
2 INJECTION, SOLUTION PERINEURAL ONCE
Status: COMPLETED | OUTPATIENT
Start: 2022-05-16 | End: 2022-05-17

## 2022-05-16 RX ORDER — IBUPROFEN 200 MG
200 TABLET ORAL PRN
COMMUNITY
End: 2022-10-31

## 2022-05-16 RX ORDER — METHYLPREDNISOLONE ACETATE 40 MG/ML
40 INJECTION, SUSPENSION INTRA-ARTICULAR; INTRALESIONAL; INTRAMUSCULAR; SOFT TISSUE ONCE
Status: COMPLETED | OUTPATIENT
Start: 2022-05-16 | End: 2022-05-17

## 2022-05-16 RX ORDER — LIDOCAINE HYDROCHLORIDE 10 MG/ML
2 INJECTION, SOLUTION INFILTRATION; PERINEURAL ONCE
Status: COMPLETED | OUTPATIENT
Start: 2022-05-16 | End: 2022-05-17

## 2022-05-16 NOTE — PROGRESS NOTES
Orthopedic Office Note  [facility], [facility]  [address]  [address], [address]  [facility] [address]      CHIEF COMPLAINT:    Chief Complaint   Patient presents with    Knee Pain     rech left knee       HISTORY OF PRESENT ILLNESS:      The patient is a 27 y.o. male  who presents today for follow-up of his left knee work-related injury. He reports since last being seen he has noted some improvement in his symptoms. He reports symptoms seem to be more intermittent and sporadic now although then when they occur are still fairly significant. Past Medical History:    Past Medical History:   Diagnosis Date    Deep vein thrombosis (DVT) (HCC)     right upper extremity    Ear infection     history of multiple as a child    IV drug abuse ([facility] [address]     Oxycontin    Marijuana abuse     Tobacco abuse        Past Surgical History:    Past Surgical History:   Procedure Laterality Date    COLONOSCOPY  2015    Protitis    HAND SURGERY Right        Medications Prior to Admission:   Current Outpatient Medications   Medication Sig Dispense Refill    ibuprofen (ADVIL;MOTRIN) 200 MG tablet Take 200 mg by mouth as needed for Pain       Current Facility-Administered Medications   Medication Dose Route Frequency Provider Last Rate Last Admin    bupivacaine (MARCAINE) 0.5 % injection 10 mg  2 mL Intra-artICUlar Once Verner Bunkers, MD        methylPREDNISolone acetate (DEPO-MEDROL) injection 40 mg  40 mg Intra-artICUlar Once Verner Bunkers, MD        lidocaine 1 % injection 2 mL  2 mL Intra-artICUlar Once Verner Bunkers, MD           Allergies:  Patient has no known allergies.     Social History:   Social History     Tobacco Use   Smoking Status Former Smoker    Packs/day: 0.50    Years: 3.00    Pack years: 1.50    Types: Cigarettes    Quit date: 11/15/2016    Years since quittin.5   Smokeless Tobacco Former User    Types: Chew     Social History     Substance and Sexual Activity   Alcohol Use Yes    Alcohol/week: 0.0 standard drinks    Comment: occassional- once every couple months 2 drinks mainly     Social History     Substance and Sexual Activity   Drug Use No    Comment: history of IV OxyContin abuse and marijuana abuse        Family History:  Family History   Problem Relation Age of Onset    Heart Disease Other     Hypertension Other     Asthma Other     Diabetes Paternal Grandmother          REVIEW OF SYSTEMS:  Please see the ROS form attached to today's encounter. I have reviewed it with the patient during the visit. All other systems were reviewed and are negative. PHYSICAL EXAM:  Examination today of his left knee reveals no joint effusion. No erythema or warmth. Full knee range of motion. Medial joint line tenderness. Negative Lachman's test.  Gait is normal.    Radiology:      ASSESSMENT/PLAN:  1. Rupture of anterior cruciate ligament of left knee, subsequent encounter        His left knee was injected today in clinic with 1 mL of Depo-Medrol under sterile conditions. He tolerated this well. He will follow-up here in 6 weeks to reassess his progress.         Procedures    DE ARTHROCENTESIS ASPIR&/INJ MAJOR JT/BURSA W/O Dimitris Cade MD

## 2022-05-17 PROCEDURE — PBSHW PBB SHADOW CHARGE: Performed by: ORTHOPAEDIC SURGERY

## 2022-05-17 RX ADMIN — LIDOCAINE HYDROCHLORIDE 2 ML: 10 INJECTION, SOLUTION INFILTRATION; PERINEURAL at 07:20

## 2022-05-17 RX ADMIN — METHYLPREDNISOLONE ACETATE 40 MG: 40 INJECTION, SUSPENSION INTRA-ARTICULAR; INTRALESIONAL; INTRAMUSCULAR; INTRASYNOVIAL; SOFT TISSUE at 07:19

## 2022-05-17 RX ADMIN — BUPIVACAINE HYDROCHLORIDE 10 MG: 5 INJECTION, SOLUTION PERINEURAL at 07:18

## 2022-06-15 ENCOUNTER — OFFICE VISIT (OUTPATIENT)
Dept: PRIMARY CARE CLINIC | Age: 32
End: 2022-06-15
Payer: COMMERCIAL

## 2022-06-15 VITALS
WEIGHT: 145 LBS | TEMPERATURE: 98.2 F | OXYGEN SATURATION: 98 % | HEART RATE: 74 BPM | BODY MASS INDEX: 23.4 KG/M2 | SYSTOLIC BLOOD PRESSURE: 120 MMHG | DIASTOLIC BLOOD PRESSURE: 74 MMHG

## 2022-06-15 DIAGNOSIS — J06.9 UPPER RESPIRATORY TRACT INFECTION, UNSPECIFIED TYPE: Primary | ICD-10-CM

## 2022-06-15 DIAGNOSIS — J02.9 SORE THROAT: ICD-10-CM

## 2022-06-15 LAB — S PYO AG THROAT QL: NORMAL

## 2022-06-15 PROCEDURE — 87880 STREP A ASSAY W/OPTIC: CPT | Performed by: NURSE PRACTITIONER

## 2022-06-15 PROCEDURE — 99213 OFFICE O/P EST LOW 20 MIN: CPT | Performed by: NURSE PRACTITIONER

## 2022-06-15 ASSESSMENT — ENCOUNTER SYMPTOMS
ABDOMINAL PAIN: 0
WHEEZING: 0
RHINORRHEA: 0
SORE THROAT: 1
NAUSEA: 0
VOMITING: 0
SHORTNESS OF BREATH: 0
COUGH: 1
CHEST TIGHTNESS: 0

## 2022-06-15 ASSESSMENT — PATIENT HEALTH QUESTIONNAIRE - PHQ9
SUM OF ALL RESPONSES TO PHQ9 QUESTIONS 1 & 2: 0
SUM OF ALL RESPONSES TO PHQ QUESTIONS 1-9: 0
SUM OF ALL RESPONSES TO PHQ QUESTIONS 1-9: 0
1. LITTLE INTEREST OR PLEASURE IN DOING THINGS: 0
SUM OF ALL RESPONSES TO PHQ QUESTIONS 1-9: 0
SUM OF ALL RESPONSES TO PHQ QUESTIONS 1-9: 0
2. FEELING DOWN, DEPRESSED OR HOPELESS: 0

## 2022-06-15 NOTE — LETTER
[facility] A department of [facility]  Phone: [phone]  Fax: [phone]        MILAGROS Gutiérrez CNP      Albina 15, 2022    Patient:   Mo Bethea  Date of Birth   1990  Date of visit   6/15/2022        To Whom it May Concern:      Marina Foot was seen in my clinic on 6/15/2022. Please excuse from work 06/15/22 and 06/16/22. If you have any questions or concerns, please don't hesitate to call.       Sincerely,      MILAGROS Gutiérrez CNP

## 2022-06-15 NOTE — PATIENT INSTRUCTIONS
Patient Education        Sore Throat: Care Instructions  Overview     Infection by bacteria or a virus causes most sore throats. Cigarette smoke, dry air, air pollution, allergies, and yelling can also cause a sore throat. Sore throats can be painful and annoying. Fortunately, most sore throats go away on their own. If you have a bacterial infection, your doctor may prescribeantibiotics. Follow-up care is a key part of your treatment and safety. Be sure to make and go to all appointments, and call your doctor if you are having problems. It's also a good idea to know your test results and keep alist of the medicines you take. How can you care for yourself at home?  If your doctor prescribed antibiotics, take them as directed. Do not stop taking them just because you feel better. You need to take the full course of antibiotics.  Gargle with warm salt water several times a day to help reduce swelling and relieve pain. Mix 1/2 teaspoon of salt in 1 cup of warm water.  Take an over-the-counter pain medicine, such as acetaminophen (Tylenol), ibuprofen (Advil, Motrin), or naproxen (Aleve). Read and follow all instructions on the label.  Be careful when taking over-the-counter cold or flu medicines and Tylenol at the same time. Many of these medicines have acetaminophen, which is Tylenol. Read the labels to make sure that you are not taking more than the recommended dose. Too much acetaminophen (Tylenol) can be harmful.  Drink plenty of fluids. Fluids may help soothe an irritated throat. Hot fluids, such as tea or soup, may help decrease throat pain.  Use over-the-counter throat lozenges to soothe pain. Regular cough drops or hard candy may also help. These should not be given to young children because of the risk of choking.  Do not smoke or allow others to smoke around you. If you need help quitting, talk to your doctor about stop-smoking programs and medicines.  These can increase your chances of quitting for good.  Use a vaporizer or humidifier to add moisture to your bedroom. Follow the directions for cleaning the machine. When should you call for help? Call your doctor now or seek immediate medical care if:     You have trouble breathing.      Your sore throat gets much worse on one side.      You have new or worse trouble swallowing.      You have a new or higher fever. Watch closely for changes in your health, and be sure to contact your doctor ifyou do not get better as expected. Where can you learn more? Go to https://AffinegypeWhistleeb.MumumÃ­o. org and sign in to your [employer] account. Enter L700 in the Webmedx box to learn more about \"Sore Throat: Care Instructions. \"     If you do not have an account, please click on the \"Sign Up Now\" link. Current as of: September 8, 2021               Content Version: 13.2  © 2006-2022 "[employer]. Care instructions adapted under license by [facility] ([place] [facility]). If you have questions about a medical condition or this instruction, always ask your healthcare professional. [place] [address] any warranty or liability for your use of this information. Patient Education        Viral Respiratory Infection: Care Instructions  Your Care Instructions     Viruses are very small organisms. They grow in number after they enter your body. There are many types that cause different illnesses, such as colds andthe mumps. The symptoms of a viral respiratory infection often start quickly. They include a fever, sore throat, and runny nose. You may also just not feel well. Or youmay not want to eat much. Most viral respiratory infections are not serious. They usually get better withtime and self-care. Antibiotics are not used to treat a viral infection. That's because antibiotics will not help cure a viral illness. In some cases, antiviral medicine can helpyour body fight a serious viral infection.   Follow-up care is a key part of your treatment and safety. Be sure to make and go to all appointments, and call your doctor if you are having problems. It's also a good idea to know your test results and keep alist of the medicines you take. How can you care for yourself at home?  Rest as much as possible until you feel better.  Be safe with medicines. Take your medicine exactly as prescribed. Call your doctor if you think you are having a problem with your medicine. You will get more details on the specific medicine your doctor prescribes.  Take an over-the-counter pain medicine, such as acetaminophen (Tylenol), ibuprofen (Advil, Motrin), or naproxen (Aleve), as needed for pain and fever. Read and follow all instructions on the label. Do not give aspirin to anyone younger than 20. It has been linked to Reye syndrome, a serious illness.  Drink plenty of fluids. Hot fluids, such as tea or soup, may help relieve congestion in your nose and throat. If you have kidney, heart, or liver disease and have to limit fluids, talk with your doctor before you increase the amount of fluids you drink.  Try to clear mucus from your lungs by breathing deeply and coughing.  Gargle with warm salt water once an hour. This can help reduce swelling and throat pain. Use 1 teaspoon of salt mixed in 1 cup of warm water.  Do not smoke or allow others to smoke around you. If you need help quitting, talk to your doctor about stop-smoking programs and medicines. These can increase your chances of quitting for good. To avoid spreading the virus   Cough or sneeze into a tissue. Then throw the tissue away.  If you don't have a tissue, use your hand to cover your cough or sneeze. Then clean your hand. You can also cough into your sleeve.  Wash your hands often. Use soap and warm water. Wash for 15 to 20 seconds each time.  If you don't have soap and water near you, you can clean your hands with alcohol wipes or gel.   When should you call for

## 2022-06-15 NOTE — PROGRESS NOTES
[facility]             [address], [address]                        Telephone [phone]             Fax [phone]     Angela Berumen  1990  FYF:[id number]   Date of visit:  6/15/2022    Subjective:    Angela Berumen is a 27 y.o.  male who presents to [facility] today (6/15/2022) for evaluation of:    Chief Complaint   Patient presents with    Pharyngitis     sinus pressure, ears full, fatigue positive covid 4 weeks ago home test        Pharyngitis  This is a new problem. The current episode started in the past 7 days (06/13/22). The problem occurs constantly. The problem has been gradually worsening. Associated symptoms include congestion, coughing (began today), fatigue, a fever (99.7 yesterday) and a sore throat (7/10). Pertinent negatives include no abdominal pain, chest pain, chills, headaches, myalgias, nausea, rash or vomiting. Associated symptoms comments: Postnasal drainage. The symptoms are aggravated by swallowing. Treatments tried: vicks ointment, thera flu, ibuprofen. The treatment provided no relief. He had Covid-19 4 weeks ago. He has the following problem list:  Patient Active Problem List   Diagnosis    Ulcerative colitis confined to rectum [facility])        Current medications are:  Current Outpatient Medications   Medication Sig Dispense Refill    ibuprofen (ADVIL;MOTRIN) 200 MG tablet Take 200 mg by mouth as needed for Pain       No current facility-administered medications for this visit. He has No Known Allergies. Tracy Van He  reports that he quit smoking about 5 years ago. His smoking use included cigarettes. He has a 1.50 pack-year smoking history. He has quit using smokeless tobacco.  His smokeless tobacco use included chew.       Objective:    Vitals:    06/15/22 1606   BP: 120/74   Site: Left Upper Arm   Position: Sitting   Cuff Size: Large Adult   Pulse: 74 Temp: 98.2 °F (36.8 °C)   TempSrc: Tympanic   SpO2: 98%   Weight: 145 lb (65.8 kg)     Body mass index is 23.4 kg/m². Review of Systems   Constitutional: Positive for fatigue and fever (99.7 yesterday). Negative for appetite change and chills. HENT: Positive for congestion, postnasal drip and sore throat (7/10). Negative for rhinorrhea. Respiratory: Positive for cough (began today). Negative for chest tightness, shortness of breath and wheezing. Cardiovascular: Negative. Negative for chest pain. Gastrointestinal: Negative for abdominal pain, nausea and vomiting. Musculoskeletal: Negative for myalgias. Skin: Negative for rash. Neurological: Negative for headaches. Physical Exam  Vitals and nursing note reviewed. Constitutional:       Appearance: He is well-developed. HENT:      Head: Normocephalic. Jaw: There is normal jaw occlusion. Right Ear: Tympanic membrane, ear canal and external ear normal.      Left Ear: Tympanic membrane, ear canal and external ear normal.      Nose: Congestion present. Right Turbinates: Swollen. Left Turbinates: Swollen. Right Sinus: No maxillary sinus tenderness or frontal sinus tenderness. Left Sinus: No maxillary sinus tenderness or frontal sinus tenderness. Mouth/Throat:      Lips: Pink. Mouth: Mucous membranes are moist.      Pharynx: Uvula midline. Pharyngeal swelling and posterior oropharyngeal erythema present. Tonsils: 1+ on the right. 1+ on the left. Eyes:      Pupils: Pupils are equal, round, and reactive to light. Cardiovascular:      Rate and Rhythm: Normal rate and regular rhythm. Heart sounds: Normal heart sounds. Pulmonary:      Effort: Pulmonary effort is normal.      Breath sounds: Normal breath sounds and air entry. Musculoskeletal:      Cervical back: Normal range of motion and neck supple. Lymphadenopathy:      Head:      Right side of head: Tonsillar adenopathy present. Left side of head: Tonsillar adenopathy present. Skin:     General: Skin is warm and dry. Neurological:      General: No focal deficit present. Mental Status: He is alert and oriented to person, place, and time. Psychiatric:         Behavior: Behavior normal.         Thought Content: Thought content normal.       Assessment and Plan:    Results for POC orders placed in visit on 06/15/22   POCT rapid strep A   Result Value Ref Range    Strep A Ag None Detected None Detected        Diagnosis Orders   1. Upper respiratory tract infection, unspecified type     2. Sore throat  POCT rapid strep A     I recommended alternating tylenol and ibuprofen for pain, increase fluid intake, and eating popsicles and jello for comfort. Warm salt water gargles. Use Chloraseptic spray as needed for sore throat. I recommended that he use mucinex to help with congestion and cough. I also recommended Flonase for sinus symptoms. Increase water intake. Use cool mist humidifier at bedtime. Use nasal saline flush as needed. Good hand hygiene. Follow up with PCP if symptoms not improved or worsen. The use, risks, benefits, and side effects of prescribed or recommended medications were discussed. All questions were answered and the patient/caregiver voiced understanding. No orders of the defined types were placed in this encounter.         Electronically signed by MILAGROS Alonso CNP on 6/15/22 at 4:23 PM EDT

## 2022-07-11 ENCOUNTER — OFFICE VISIT (OUTPATIENT)
Dept: ORTHOPEDIC SURGERY | Age: 32
End: 2022-07-11
Payer: COMMERCIAL

## 2022-07-11 VITALS
WEIGHT: 145 LBS | HEIGHT: 66 IN | BODY MASS INDEX: 23.3 KG/M2 | HEART RATE: 91 BPM | DIASTOLIC BLOOD PRESSURE: 76 MMHG | SYSTOLIC BLOOD PRESSURE: 114 MMHG

## 2022-07-11 DIAGNOSIS — S83.512D RUPTURE OF ANTERIOR CRUCIATE LIGAMENT OF LEFT KNEE, SUBSEQUENT ENCOUNTER: Primary | ICD-10-CM

## 2022-07-11 PROCEDURE — 99211 OFF/OP EST MAY X REQ PHY/QHP: CPT | Performed by: ORTHOPAEDIC SURGERY

## 2022-07-11 PROCEDURE — 99213 OFFICE O/P EST LOW 20 MIN: CPT | Performed by: ORTHOPAEDIC SURGERY

## 2022-07-11 NOTE — PROGRESS NOTES
Orthopedic Office Note  [facility], [facility]  [address]  [address], [address]  [facility] [address]      CHIEF COMPLAINT:    Chief Complaint   Patient presents with    Knee Pain     rech left knee       HISTORY OF PRESENT ILLNESS:      The patient is a 27 y.o. male  who presents today for follow-up of his left knee work-related injury. He reports since getting the corticosteroid injection his pain is almost completely resolved. He has a mild deep discomfort. Daily activities are no longer painful. Past Medical History:    Past Medical History:   Diagnosis Date    Deep vein thrombosis (DVT) (HCC)     right upper extremity    Ear infection     history of multiple as a child    IV drug abuse ([address]     Oxycontin    Marijuana abuse     Tobacco abuse        Past Surgical History:    Past Surgical History:   Procedure Laterality Date    COLONOSCOPY  2015    Protitis    HAND SURGERY Right        Medications Prior to Admission:   Current Outpatient Medications   Medication Sig Dispense Refill    diclofenac sodium (VOLTAREN) 1 % GEL Apply topically as needed for Pain      ibuprofen (ADVIL;MOTRIN) 200 MG tablet Take 200 mg by mouth as needed for Pain       No current facility-administered medications for this visit. Allergies:  Patient has no known allergies.     Social History:   Social History     Tobacco Use   Smoking Status Former Smoker    Packs/day: 0.50    Years: 3.00    Pack years: 1.50    Types: Cigarettes    Quit date: 11/15/2016    Years since quittin.6   Smokeless Tobacco Former User    Types: Chew     Social History     Substance and Sexual Activity   Alcohol Use Yes    Alcohol/week: 0.0 standard drinks    Comment: occassional- once every couple months 2 drinks mainly     Social History     Substance and Sexual Activity   Drug Use No    Comment: history of IV OxyContin abuse and marijuana abuse        Family History:  Family History   Problem Relation Age of Onset    Heart Disease Other     Hypertension Other     Asthma Other     Diabetes Paternal Grandmother          REVIEW OF SYSTEMS:  Please see the ROS form attached to today's encounter. I have reviewed it with the patient during the visit. All other systems were reviewed and are negative. PHYSICAL EXAM:  Examination today of his left knee reveals no effusion. He has full knee range of motion without pain. He has a negative Lachman's test.  His gait today is normal.    Radiology:      ASSESSMENT/PLAN:  1. Rupture of anterior cruciate ligament of left knee, subsequent encounter        Patient would like to return to work tomorrow without restrictions. He has changed jobs and will be starting on the 18th and a new job. He will follow-up in 3 months to reassess his progress. No orders of the defined types were placed in this encounter.        Stefan Garcia MD

## 2022-10-31 ENCOUNTER — OFFICE VISIT (OUTPATIENT)
Dept: ORTHOPEDIC SURGERY | Age: 32
End: 2022-10-31
Payer: COMMERCIAL

## 2022-10-31 VITALS
WEIGHT: 145 LBS | HEIGHT: 66 IN | DIASTOLIC BLOOD PRESSURE: 80 MMHG | BODY MASS INDEX: 23.3 KG/M2 | HEART RATE: 54 BPM | OXYGEN SATURATION: 95 % | SYSTOLIC BLOOD PRESSURE: 112 MMHG

## 2022-10-31 DIAGNOSIS — S83.512D RUPTURE OF ANTERIOR CRUCIATE LIGAMENT OF LEFT KNEE, SUBSEQUENT ENCOUNTER: Primary | ICD-10-CM

## 2022-10-31 PROCEDURE — 99213 OFFICE O/P EST LOW 20 MIN: CPT | Performed by: ORTHOPAEDIC SURGERY

## 2022-10-31 PROCEDURE — 99211 OFF/OP EST MAY X REQ PHY/QHP: CPT | Performed by: ORTHOPAEDIC SURGERY

## 2022-10-31 NOTE — PROGRESS NOTES
Orthopedic Office Note  [address] ORTHOPEDICS A DEPARTMENT OF [place] [address]  [address], [address]  [facility] [address]      CHIEF COMPLAINT:    Chief Complaint   Patient presents with    Knee Pain     Re-check left knee       HISTORY OF PRESENT ILLNESS:      The patient is a 27 y.o. male  who presents today for follow-up of his left knee work-related injury. He reports he is able to carry out his current job functions without any difficulties. Activities such as pushing a heavy cart or climbing up stairs are associated with some discomfort. He takes Aleve regularly which seems to help with the symptoms. Past Medical History:    Past Medical History:   Diagnosis Date    Deep vein thrombosis (DVT) (HCC)     right upper extremity    Ear infection     history of multiple as a child    IV drug abuse ([facility] [address]     Oxycontin    Marijuana abuse     Tobacco abuse        Past Surgical History:    Past Surgical History:   Procedure Laterality Date    COLONOSCOPY  2015    Protitis    HAND SURGERY Right        Medications Prior to Admission:   Current Outpatient Medications   Medication Sig Dispense Refill    Naproxen Sodium (ALEVE PO) Take by mouth      diclofenac sodium (VOLTAREN) 1 % GEL Apply topically as needed for Pain       No current facility-administered medications for this visit. Allergies:  Patient has no known allergies.     Social History:   Social History     Tobacco Use   Smoking Status Former    Packs/day: 0.50    Years: 3.00    Pack years: 1.50    Types: Cigarettes    Quit date: 11/15/2016    Years since quittin.9   Smokeless Tobacco Former    Types: Chew     Social History     Substance and Sexual Activity   Alcohol Use Yes    Alcohol/week: 0.0 standard drinks    Comment: occassional- once every couple months 2 drinks mainly     Social History     Substance and Sexual Activity   Drug Use No    Comment: history of IV OxyContin abuse and marijuana abuse        Family History:  Family History   Problem Relation Age of Onset    Heart Disease Other     Hypertension Other     Asthma Other     Diabetes Paternal Grandmother          REVIEW OF SYSTEMS:  Please see the ROS form attached to today's encounter. I have reviewed it with the patient during the visit. All other systems were reviewed and are negative. PHYSICAL EXAM:  On exam today walks with normal gait. He has no joint effusion. He has some mild crepitance with in the patellofemoral joint with knee range of motion. No medial or lateral joint line tenderness. No instability with Lachman's testing today. Radiology:      ASSESSMENT/PLAN:  1. Rupture of anterior cruciate ligament of left knee, subsequent encounter        The patient feels as though overall he is doing well. He does get some discomfort with certain activities but is able to carry out all work functions without problems. He does not feel the need for additional treatment at this time. He will follow-up in 1 month to reassess his progress. No orders of the defined types were placed in this encounter.        George Sherman MD

## 2023-03-08 DIAGNOSIS — S83.512D RUPTURE OF ANTERIOR CRUCIATE LIGAMENT OF LEFT KNEE, SUBSEQUENT ENCOUNTER: Primary | ICD-10-CM

## 2023-03-13 ENCOUNTER — HOSPITAL ENCOUNTER (OUTPATIENT)
Dept: GENERAL RADIOLOGY | Age: 33
Discharge: HOME | End: 2023-03-15
Payer: COMMERCIAL

## 2023-03-13 ENCOUNTER — OFFICE VISIT (OUTPATIENT)
Dept: ORTHOPEDIC SURGERY | Age: 33
End: 2023-03-13
Payer: COMMERCIAL

## 2023-03-13 VITALS
HEART RATE: 100 BPM | WEIGHT: 145 LBS | HEIGHT: 66 IN | DIASTOLIC BLOOD PRESSURE: 77 MMHG | SYSTOLIC BLOOD PRESSURE: 118 MMHG | BODY MASS INDEX: 23.3 KG/M2

## 2023-03-13 DIAGNOSIS — S83.512D RUPTURE OF ANTERIOR CRUCIATE LIGAMENT OF BOTH KNEES, SUBSEQUENT ENCOUNTER: Primary | ICD-10-CM

## 2023-03-13 DIAGNOSIS — S83.512D RUPTURE OF ANTERIOR CRUCIATE LIGAMENT OF LEFT KNEE, SUBSEQUENT ENCOUNTER: ICD-10-CM

## 2023-03-13 DIAGNOSIS — S83.511D RUPTURE OF ANTERIOR CRUCIATE LIGAMENT OF BOTH KNEES, SUBSEQUENT ENCOUNTER: Primary | ICD-10-CM

## 2023-03-13 PROCEDURE — 99212 OFFICE O/P EST SF 10 MIN: CPT | Performed by: ORTHOPAEDIC SURGERY

## 2023-03-13 PROCEDURE — 73562 X-RAY EXAM OF KNEE 3: CPT

## 2023-03-13 NOTE — PROGRESS NOTES
Orthopedic Office Note  [facility] CARE, [facility]  [facility] A DEPARTMENT OF [facility]  [address]  [facility] [address]      CHIEF COMPLAINT:    Chief Complaint   Patient presents with    Knee Pain     Re ck left knee pain       HISTORY OF PRESENT ILLNESS:      The patient is a 32 y.o. male  who presents today for follow-up of his left knee work-related injury.  He reports recently he has noticed his knee pain is started to return.  He has developed more aching in his knee and pain with activities of daily living.    Past Medical History:    Past Medical History:   Diagnosis Date    Deep vein thrombosis (DVT) ([facility])     right upper extremity    Ear infection     history of multiple as a child    IV drug abuse ([facility])     Oxycontin    Marijuana abuse     Tobacco abuse        Past Surgical History:    Past Surgical History:   Procedure Laterality Date    COLONOSCOPY  2015    Protitis    HAND SURGERY Right        Medications Prior to Admission:   Current Outpatient Medications   Medication Sig Dispense Refill    Naproxen Sodium (ALEVE PO) Take by mouth      diclofenac sodium (VOLTAREN) 1 % GEL Apply topically as needed for Pain       No current facility-administered medications for this visit.       Allergies:  Patient has no known allergies.    Social History:   Social History     Tobacco Use   Smoking Status Former    Packs/day: 0.50    Years: 3.00    Pack years: 1.50    Types: Cigarettes    Quit date: 11/15/2016    Years since quittin.3   Smokeless Tobacco Former    Types: Chew     Social History     Substance and Sexual Activity   Alcohol Use Yes    Alcohol/week: 0.0 standard drinks    Comment: occassional- once every couple months 2 drinks mainly     Social History     Substance and Sexual Activity   Drug Use No    Comment: history of IV OxyContin abuse and marijuana abuse        Family History:  Family History  Problem Relation Age of Onset    Heart Disease Other     Hypertension Other     Asthma Other     Diabetes Paternal Grandmother          REVIEW OF SYSTEMS:  Please see the ROS form attached to today's encounter. I have reviewed it with the patient during the visit. All other systems were reviewed and are negative. PHYSICAL EXAM:  Left knee exam today reveals full range of motion. No gross ligamentous instability. Gait is not antalgic. No joint effusion. Medial and lateral joint line tenderness is present. Radiology:  X-rays of his left knee show no acute abnormalities. ASSESSMENT/PLAN:  1. Rupture of anterior cruciate ligament of both knees, subsequent encounter        For his left knee injury he has had good relief in the past with a steroid injection. Given his return of pain I recommended a repeat corticosteroid injection. He will follow-up pending approval for this injection. No orders of the defined types were placed in this encounter.        Sabine Macias MD